# Patient Record
Sex: MALE | Race: WHITE | Employment: OTHER | URBAN - METROPOLITAN AREA
[De-identification: names, ages, dates, MRNs, and addresses within clinical notes are randomized per-mention and may not be internally consistent; named-entity substitution may affect disease eponyms.]

---

## 2022-07-07 ENCOUNTER — OFFICE VISIT (OUTPATIENT)
Dept: FAMILY MEDICINE CLINIC | Age: 72
End: 2022-07-07

## 2022-07-07 VITALS
RESPIRATION RATE: 16 BRPM | HEART RATE: 67 BPM | WEIGHT: 170 LBS | TEMPERATURE: 97.3 F | OXYGEN SATURATION: 98 % | HEIGHT: 67 IN | DIASTOLIC BLOOD PRESSURE: 109 MMHG | BODY MASS INDEX: 26.68 KG/M2 | SYSTOLIC BLOOD PRESSURE: 150 MMHG

## 2022-07-07 DIAGNOSIS — M79.10 PAIN IN THE MUSCLES: Primary | ICD-10-CM

## 2022-07-07 DIAGNOSIS — I10 ESSENTIAL HYPERTENSION: ICD-10-CM

## 2022-07-07 DIAGNOSIS — M17.10 ARTHRITIS OF KNEE: ICD-10-CM

## 2022-07-07 PROBLEM — G62.9 NEUROPATHY: Status: ACTIVE | Noted: 2021-06-14

## 2022-07-07 PROBLEM — R07.9 CHEST PAIN, RULE OUT ACUTE MYOCARDIAL INFARCTION: Status: ACTIVE | Noted: 2018-08-31

## 2022-07-07 PROBLEM — E66.3 OVERWEIGHT WITH BODY MASS INDEX (BMI) 25.0-29.9: Status: ACTIVE | Noted: 2021-06-14

## 2022-07-07 PROBLEM — R91.1 PULMONARY NODULE: Status: ACTIVE | Noted: 2018-08-31

## 2022-07-07 PROBLEM — T84.84XA PAINFUL ORTHOPAEDIC HARDWARE (HCC): Status: ACTIVE | Noted: 2018-03-27

## 2022-07-07 PROCEDURE — 1123F ACP DISCUSS/DSCN MKR DOCD: CPT | Performed by: NURSE PRACTITIONER

## 2022-07-07 PROCEDURE — 99203 OFFICE O/P NEW LOW 30 MIN: CPT | Performed by: NURSE PRACTITIONER

## 2022-07-07 RX ORDER — ACETAMINOPHEN 500 MG
500 TABLET ORAL EVERY 6 HOURS PRN
Qty: 20 TABLET | Refills: 0 | Status: SHIPPED | OUTPATIENT
Start: 2022-07-07

## 2022-07-07 RX ORDER — DIAPER,BRIEF,INFANT-TODD,DISP
EACH MISCELLANEOUS
COMMUNITY
Start: 2022-06-23

## 2022-07-07 RX ORDER — ATORVASTATIN CALCIUM 10 MG/1
TABLET, FILM COATED ORAL
COMMUNITY
Start: 2022-06-20 | End: 2022-08-11 | Stop reason: SDUPTHER

## 2022-07-07 RX ORDER — LISINOPRIL 5 MG/1
5 TABLET ORAL DAILY
Qty: 30 TABLET | Refills: 0 | Status: SHIPPED | OUTPATIENT
Start: 2022-07-07 | End: 2022-07-21 | Stop reason: SDUPTHER

## 2022-07-07 RX ORDER — TERAZOSIN 5 MG/1
CAPSULE ORAL
COMMUNITY
Start: 2022-06-23 | End: 2022-08-11 | Stop reason: SDUPTHER

## 2022-07-07 RX ORDER — TERAZOSIN 10 MG/1
CAPSULE ORAL
COMMUNITY
Start: 2022-05-27 | End: 2022-07-21 | Stop reason: DRUGHIGH

## 2022-07-07 ASSESSMENT — PATIENT HEALTH QUESTIONNAIRE - PHQ9
SUM OF ALL RESPONSES TO PHQ QUESTIONS 1-9: 0
1. LITTLE INTEREST OR PLEASURE IN DOING THINGS: 0
SUM OF ALL RESPONSES TO PHQ9 QUESTIONS 1 & 2: 0
SUM OF ALL RESPONSES TO PHQ QUESTIONS 1-9: 0
SUM OF ALL RESPONSES TO PHQ QUESTIONS 1-9: 0
2. FEELING DOWN, DEPRESSED OR HOPELESS: 0
SUM OF ALL RESPONSES TO PHQ QUESTIONS 1-9: 0

## 2022-07-07 ASSESSMENT — ENCOUNTER SYMPTOMS
CHEST TIGHTNESS: 0
SHORTNESS OF BREATH: 0

## 2022-07-07 ASSESSMENT — LIFESTYLE VARIABLES
HOW MANY STANDARD DRINKS CONTAINING ALCOHOL DO YOU HAVE ON A TYPICAL DAY: 1 OR 2
HOW OFTEN DO YOU HAVE A DRINK CONTAINING ALCOHOL: 2-3 TIMES A WEEK
HOW MANY STANDARD DRINKS CONTAINING ALCOHOL DO YOU HAVE ON A TYPICAL DAY: 1 OR 2

## 2022-07-07 NOTE — PATIENT INSTRUCTIONS
Patient Education        Arthritis: Care Instructions  Overview     Arthritis, also called osteoarthritis, is a breakdown of the cartilage that cushions your joints. When the cartilage wears down, your bones rub against each other. This causes pain and stiffness. Many people have some arthritis as they age. Arthritis most often affects the joints of the spine, hands, hips,knees, or feet. Arthritis never goes away completely. But medicine and home treatment can helpreduce pain and help you stay active. Follow-up care is a key part of your treatment and safety. Be sure to make and go to all appointments, and call your doctor if you are having problems. It's also a good idea to know your test results and keep alist of the medicines you take. How can you care for yourself at home?  Stay at a healthy weight. Being overweight puts extra strain on your joints.  Talk to your doctor or physical therapist about exercises that will help ease joint pain. ? Stretch. You may enjoy gentle forms of yoga to help keep your joints and muscles flexible. ? Walk instead of jog. Other types of exercise that are less stressful on the joints include riding a bike, swimming, sheri chi, or water exercise. ? Lift weights. Strong muscles help reduce stress on your joints. Stronger thigh muscles, for example, take some of the stress off of the knees and hips. Learn the right way to lift weights so you don't make joint pain worse.  Take your medicines exactly as prescribed. Call your doctor if you think you are having a problem with your medicine.  Take pain medicines exactly as directed. ? If the doctor gave you a prescription medicine for pain, take it as prescribed. ? If you are not taking a prescription pain medicine, ask your doctor if you can take an over-the-counter medicine.  Use a cane, crutch, walker, or another device if you need help to get around. These can help rest your joints.  You also can use other things to make life easier, such as a higher toilet seat and padded handles on kitchen utensils.  Do not sit in low chairs. They can make it hard to get up.  Put heat or cold on your sore joints as needed. Use whichever helps you most. You can also switch between hot and cold packs. ? Apply heat 2 or 3 times a day for 20 to 30 minutes--using a heating pad, hot shower, or hot pack--to relieve pain and stiffness. But don't use heat on a swollen joint. ? Put ice or a cold pack on your sore joint for 10 to 20 minutes at a time. Put a thin cloth between the ice and your skin. When should you call for help? Call your doctor now or seek immediate medical care if:     You have sudden swelling, warmth, or pain in any joint.      You have joint pain and a fever or rash.      You have such bad pain that you cannot use a joint. Watch closely for changes in your health, and be sure to contact your doctor if:     You have mild joint symptoms that continue even with more than 6 weeks of care at home.      You have stomach pain or other problems with your medicine. Where can you learn more? Go to https://Reset Therapeutics.Conceptua Math. org and sign in to your MATIvision account. Enter Y127 in the [facility] box to learn more about \"Arthritis: Care Instructions. \"     If you do not have an account, please click on the \"Sign Up Now\" link. Current as of: December 20, 2021               Content Version: 13.3  © 2006-2022 Vingle. Care instructions adapted under license by [facility] ([facility]). If you have questions about a medical condition or this instruction, always ask your healthcare professional. [place] [address] any warranty or liability for your use of this information. Patient Education        DASH Diet: Care Instructions  Your Care Instructions     The DASH diet is an eating plan that can help lower your blood pressure.  DASH stands for Dietary Approaches to Stop Hypertension. Hypertension is high bloodpressure. The DASH diet focuses on eating foods that are high in calcium, potassium, and magnesium. These nutrients can lower blood pressure. The foods that are highest in these nutrients are fruits, vegetables, low-fat dairy products, nuts, seeds, and legumes. But taking calcium, potassium, and magnesium supplements instead of eating foods that are high in those nutrients does not have the same effect. The DASH diet also includes whole grains, fish, and poultry. The DASH diet is one of several lifestyle changes your doctor may recommend to lower your high blood pressure. Your doctor may also want you to decrease the amount of sodium in your diet. Lowering sodium while following the DASH dietcan lower blood pressure even further than just the DASH diet alone. Follow-up care is a key part of your treatment and safety. Be sure to make and go to all appointments, and call your doctor if you are having problems. It's also a good idea to know your test results and keep alist of the medicines you take. How can you care for yourself at home? Following the DASH diet   Eat 4 to 5 servings of fruit each day. A serving is 1 medium-sized piece of fruit, ½ cup chopped or canned fruit, 1/4 cup dried fruit, or 4 ounces (½ cup) of fruit juice. Choose fruit more often than fruit juice.  Eat 4 to 5 servings of vegetables each day. A serving is 1 cup of lettuce or raw leafy vegetables, ½ cup of chopped or cooked vegetables, or 4 ounces (½ cup) of vegetable juice. Choose vegetables more often than vegetable juice.  Get 2 to 3 servings of low-fat and fat-free dairy each day. A serving is 8 ounces of milk, 1 cup of yogurt, or 1 ½ ounces of cheese.  Eat 6 to 8 servings of grains each day. A serving is 1 slice of bread, 1 ounce of dry cereal, or ½ cup of cooked rice, pasta, or cooked cereal. Try to choose whole-grain products as much as possible.    Limit lean meat, poultry, and fish to 2 servings each day. A serving is 3 ounces, about the size of a deck of cards.  Eat 4 to 5 servings of nuts, seeds, and legumes (cooked dried beans, lentils, and split peas) each week. A serving is 1/3 cup of nuts, 2 tablespoons of seeds, or ½ cup of cooked beans or peas.  Limit fats and oils to 2 to 3 servings each day. A serving is 1 teaspoon of vegetable oil or 2 tablespoons of salad dressing.  Limit sweets and added sugars to 5 servings or less a week. A serving is 1 tablespoon jelly or jam, ½ cup sorbet, or 1 cup of lemonade.  Eat less than 2,300 milligrams (mg) of sodium a day. If you limit your sodium to 1,500 mg a day, you can lower your blood pressure even more.  Be aware that all of these are the suggested number of servings for people who eat 1,800 to 2,000 calories a day. Your recommended number of servings may be different if you need more or fewer calories. Tips for success   Start small. Do not try to make dramatic changes to your diet all at once. You might feel that you are missing out on your favorite foods and then be more likely to not follow the plan. Make small changes, and stick with them. Once those changes become habit, add a few more changes.  Try some of the following:  ? Make it a goal to eat a fruit or vegetable at every meal and at snacks. This will make it easy to get the recommended amount of fruits and vegetables each day. ? Try yogurt topped with fruit and nuts for a snack or healthy dessert. ? Add lettuce, tomato, cucumber, and onion to sandwiches. ? Combine a ready-made pizza crust with low-fat mozzarella cheese and lots of vegetable toppings. Try using tomatoes, squash, spinach, broccoli, carrots, cauliflower, and onions. ? Have a variety of cut-up vegetables with a low-fat dip as an appetizer instead of chips and dip. ? Sprinkle sunflower seeds or chopped almonds over salads. Or try adding chopped walnuts or almonds to cooked vegetables.   ? Try some vegetarian meals using beans and peas. Add garbanzo or kidney beans to salads. Make burritos and tacos with mashed chang beans or black beans. Where can you learn more? Go to https://scotty.Catawiki. org and sign in to your IRL Gaming account. Enter I940 in the [facility] box to learn more about \"DASH Diet: Care Instructions. \"     If you do not have an account, please click on the \"Sign Up Now\" link. Current as of: January 10, 2022               Content Version: 13.3  © 3367-1413 Apps4All. Care instructions adapted under license by [facility] ([facility]). If you have questions about a medical condition or this instruction, always ask your healthcare professional. [address] any warranty or liability for your use of this information. Patient Education        Learning About High Blood Pressure  What is high blood pressure? Blood pressure is a measure of how hard the blood pushes against the walls of your arteries. It's normal for blood pressure to go up and down throughout the day. But if it stays up, you have high blood pressure. Another name for highblood pressure is hypertension. Two numbers tell you your blood pressure. The first number is the systolic pressure (top number). It shows how hard the blood pushes when your heart is pumping. The second number is the diastolic pressure (bottom number). It shows how hard the blood pushes between heartbeats, when your heart is relaxed andfilling with blood. Your doctor will give you a goal for your blood pressure based on your health and your age. High blood pressure (hypertension) means that the top numberstays high, or the bottom number stays high, or both. High blood pressure increases the risk of stroke, heart attack, and otherproblems. What happens when you have high blood pressure?  Blood flows through your arteries with too much force.  Over time, this can damage the heart and the walls of your arteries. But you can't feel it. High blood pressure usually doesn't cause symptoms.  High blood pressure makes your heart work harder. And that can lead to heart failure, which means your heart doesn't pump as much blood as your body needs.  Fat and calcium start to build up in your arteries. This buildup is called hardening of the arteries. It can cause many problems including a heart attack and stroke.  Arteries also carry blood and oxygen to organs like your eyes, kidneys, and brain. If high blood pressure damages those arteries, it can lead to vision loss, kidney disease, stroke, and a higher risk of dementia. How can you prevent high blood pressure? Here are some things you can do to help prevent high blood pressure.  Stay at a healthy weight.  Try to limit how much sodium you eat to less than 2,300 milligrams (mg) a day. If you limit your sodium to 1,500 mg a day, you can lower your blood pressure even more. ? Buy foods that are labeled \"unsalted,\" \"sodium-free,\" or \"low-sodium. \" Foods labeled \"reduced-sodium\" and \"light sodium\" may still have too much sodium. ? Flavor your food with garlic, lemon juice, onion, vinegar, herbs, and spices instead of salt. Do not use soy sauce, steak sauce, onion salt, garlic salt, mustard, or ketchup on your food. ? Use less salt (or none) when recipes call for it. You can often use half the salt a recipe calls for without losing flavor.  Be physically active. Get at least 30 minutes of exercise on most days of the week. Walking is a good choice. You also may want to do other activities, such as running, swimming, cycling, or playing tennis or team sports.  Limit alcohol to 2 drinks a day for men and 1 drink a day for women.  Eat plenty of fruits, vegetables, and low-fat dairy products. Eat less saturated and total fats. How is high blood pressure treated?  Your doctor will suggest making lifestyle changes to help your heart.  For example, your doctor may ask you to eat healthy foods, quit smoking, lose extra weight, and be more active.  If lifestyle changes don't help enough, your doctor may recommend that you take medicine.  When blood pressure is very high, medicines are needed to lower it. Follow-up care is a key part of your treatment and safety. Be sure to make and go to all appointments, and call your doctor if you are having problems. It's also a good idea to know your test results and keep alist of the medicines you take. Where can you learn more? Go to https://Casual CollectivepePaperfold.Opexa Therapeutics. org and sign in to your Alereon account. Enter P501 in the [employer] box to learn more about \"Learning About High Blood Pressure. \"     If you do not have an account, please click on the \"Sign Up Now\" link. Current as of: January 10, 2022               Content Version: 13.3  © 8404-2514 Healthwise, Incorporated. Care instructions adapted under license by [facility] ([facility]). If you have questions about a medical condition or this instruction, always ask your healthcare professional. [address] any warranty or liability for your use of this information.

## 2022-07-07 NOTE — PROGRESS NOTES
Digna Schwartz (:  1950) is a 81 y.o. male,New patient, here for evaluation of the following chief complaint(s):  Muscle Pain        SUBJECTIVE/OBJECTIVE:    HPI:  The patient is a 81 y.o. female  who presents [facility] for muscle pain. Beside patient has other multiple chronic medical conditions. He uses cane for support the balance. Good compliance with medications. Patient new concerns muscle ache. He takes Ibuprofen. Ibuprofen not good with blood pressure. Patient is taking Terazosin at night for blood pressure. I recommend Lisinopril 5 mg in the morning with breakfast for better control his blood pressure. For the pain will order tylenol 500 mg to take with food PRN. BP (!) 150/109   Pulse 67   Temp 97.3 °F (36.3 °C) (Temporal)   Resp 16   Ht 5' 7\" (1.702 m)   Wt 170 lb (77.1 kg)   SpO2 98%   BMI 26.63 kg/m²     No results found for: CBC, CMP, LIPIDPAN, TSH, HBA1C     Allergies   Allergen Reactions    Penicillins Itching       Current Outpatient Medications   Medication Sig Dispense Refill    acetaminophen (TYLENOL) 500 MG tablet Take 1 tablet by mouth every 6 hours as needed for Pain 20 tablet 0    lisinopril (PRINIVIL;ZESTRIL) 5 MG tablet Take 1 tablet by mouth daily 30 tablet 0    atorvastatin (LIPITOR) 10 MG tablet TAKE 1 TABLET BY MOUTH EVERY DAY      hydrocortisone 1 % ointment APPLY SPARINGLY TO AFFECTED AREA TWICE A DAY      terazosin (HYTRIN) 10 MG capsule TAKE 1 CAPSULE BY MOUTH AT BEDTIME      terazosin (HYTRIN) 5 MG capsule TAKE 1 CAPSULE (5 MG) BY ORAL ROUTE ONCE DAILY AT BEDTIME       No current facility-administered medications for this visit. History reviewed. No pertinent past medical history. History reviewed. No pertinent surgical history. Review of Systems   Constitutional: Negative for fatigue. HENT: Negative for congestion. Respiratory: Negative for chest tightness and shortness of breath.     Cardiovascular: Negative for chest pain.   Musculoskeletal: Positive for arthralgias and myalgias. Physical Exam  Vitals reviewed. HENT:      Head: Normocephalic. Nose: Nose normal.   Cardiovascular:      Heart sounds: Normal heart sounds. Pulmonary:      Effort: Pulmonary effort is normal.      Breath sounds: Normal breath sounds. Musculoskeletal:         General: Tenderness present. Cervical back: Normal range of motion. Neurological:      Mental Status: He is alert and oriented to person, place, and time. Psychiatric:         Mood and Affect: Mood normal.         Behavior: Behavior normal.         Thought Content: Thought content normal.         Judgment: Judgment normal.           ASSESSMENT/PLAN:  1. Pain in the muscles  -     acetaminophen (TYLENOL) 500 MG tablet; Take 1 tablet by mouth every 6 hours as needed for Pain, Disp-20 tablet, R-0Print  2. Arthritis of knee  -     acetaminophen (TYLENOL) 500 MG tablet; Take 1 tablet by mouth every 6 hours as needed for Pain, Disp-20 tablet, R-0Print  3. Essential hypertension  -     lisinopril (PRINIVIL;ZESTRIL) 5 MG tablet; Take 1 tablet by mouth daily, Disp-30 tablet, R-0Print      Return if symptoms worsen or fail to improve, for F/U mucsle pain. On this date 7/7/2022 I have spent 30 minutes reviewing previous notes, test results and face to face with the patient discussing the diagnosis and importance of compliance with the treatment plan as well as documenting on the day of the visit. An electronic signature was used to authenticate this note.     --MIRACLE Martinez - CNP

## 2022-07-21 ENCOUNTER — OFFICE VISIT (OUTPATIENT)
Dept: FAMILY MEDICINE CLINIC | Age: 72
End: 2022-07-21

## 2022-07-21 VITALS
HEART RATE: 72 BPM | RESPIRATION RATE: 15 BRPM | HEIGHT: 67 IN | WEIGHT: 171 LBS | DIASTOLIC BLOOD PRESSURE: 76 MMHG | BODY MASS INDEX: 26.84 KG/M2 | OXYGEN SATURATION: 97 % | TEMPERATURE: 97 F | SYSTOLIC BLOOD PRESSURE: 134 MMHG

## 2022-07-21 DIAGNOSIS — I10 ESSENTIAL HYPERTENSION: Primary | ICD-10-CM

## 2022-07-21 DIAGNOSIS — Z59.82 LACK OF ACCESS TO TRANSPORTATION: ICD-10-CM

## 2022-07-21 DIAGNOSIS — Z59.9 FINANCIAL DIFFICULTIES: ICD-10-CM

## 2022-07-21 PROCEDURE — 1123F ACP DISCUSS/DSCN MKR DOCD: CPT | Performed by: NURSE PRACTITIONER

## 2022-07-21 PROCEDURE — 99214 OFFICE O/P EST MOD 30 MIN: CPT | Performed by: NURSE PRACTITIONER

## 2022-07-21 RX ORDER — LISINOPRIL 5 MG/1
5 TABLET ORAL DAILY
Qty: 30 TABLET | Refills: 0 | Status: SHIPPED | OUTPATIENT
Start: 2022-07-21

## 2022-07-21 RX ORDER — LISINOPRIL 5 MG/1
5 TABLET ORAL DAILY
Qty: 30 TABLET | Refills: 0 | Status: SHIPPED | OUTPATIENT
Start: 2022-07-21 | End: 2022-07-21 | Stop reason: SDUPTHER

## 2022-07-21 SDOH — ECONOMIC STABILITY - TRANSPORTATION SECURITY: TRANSPORTATION INSECURITY: Z59.82

## 2022-07-21 SDOH — ECONOMIC STABILITY - INCOME SECURITY: PROBLEM RELATED TO HOUSING AND ECONOMIC CIRCUMSTANCES, UNSPECIFIED: Z59.9

## 2022-07-21 ASSESSMENT — LIFESTYLE VARIABLES
HOW OFTEN DO YOU HAVE A DRINK CONTAINING ALCOHOL: MONTHLY OR LESS
HOW MANY STANDARD DRINKS CONTAINING ALCOHOL DO YOU HAVE ON A TYPICAL DAY: 1 OR 2

## 2022-07-21 ASSESSMENT — ENCOUNTER SYMPTOMS
SHORTNESS OF BREATH: 0
WHEEZING: 0
CHEST TIGHTNESS: 0

## 2022-07-21 NOTE — PROGRESS NOTES
eRbekah Martinez (:  1950) is a 81 y.o. male,Established patient, here for evaluation of the following chief complaint(s):  Hypertension (Checking BP)        SUBJECTIVE/OBJECTIVE:    HPI:  [facility] 81 [facility] in [address] [place] [address] to check blood pressure and medication refill. Patient blood pressure is controlled on his current medication and he requests refill today. Patient reports taking medication as prescribed. Patient denies medication side effects. Patient denies headaches, chest pains, vision changes and swelling in his lower extremities. /76 (Site: Right Upper Arm, Position: Sitting, Cuff Size: Medium Adult)   Pulse 72   Temp 97 °F (36.1 °C) (Temporal)   Resp 15   Ht 5' 7\" (1.702 m)   Wt 171 lb (77.6 kg)   SpO2 97%   BMI 26.78 kg/m²     No results found for: CBC, CMP, LIPIDPAN, TSH, HBA1C     Allergies   Allergen Reactions    Penicillins Itching       Current Outpatient Medications   Medication Sig Dispense Refill    lisinopril (PRINIVIL;ZESTRIL) 5 MG tablet Take 1 tablet by mouth in the morning. 30 tablet 0    atorvastatin (LIPITOR) 10 MG tablet TAKE 1 TABLET BY MOUTH EVERY DAY      hydrocortisone 1 % ointment APPLY SPARINGLY TO AFFECTED AREA TWICE A DAY      terazosin (HYTRIN) 5 MG capsule TAKE 1 CAPSULE (5 MG) BY ORAL ROUTE ONCE DAILY AT BEDTIME      acetaminophen (TYLENOL) 500 MG tablet Take 1 tablet by mouth every 6 hours as needed for Pain 20 tablet 0     No current facility-administered medications for this visit. History reviewed. No pertinent past medical history. History reviewed. No pertinent surgical history. Review of Systems   Constitutional:  Negative for fatigue. HENT:  Negative for congestion. Respiratory:  Negative for chest tightness, shortness of breath and wheezing. Cardiovascular:  Negative for chest pain. Neurological:  Negative for dizziness and headaches. Physical Exam  Vitals reviewed.    HENT:      Head: Normocephalic. Nose: Nose normal.   Cardiovascular:      Heart sounds: Normal heart sounds. Pulmonary:      Effort: Pulmonary effort is normal.      Breath sounds: Normal breath sounds. Musculoskeletal:         General: Normal range of motion. Cervical back: Normal range of motion. Neurological:      Mental Status: He is alert and oriented to person, place, and time. Psychiatric:         Mood and Affect: Mood normal.         Behavior: Behavior normal.         Thought Content: Thought content normal.         Judgment: Judgment normal.         ASSESSMENT/PLAN:  1. Essential hypertension  -     lisinopril (PRINIVIL;ZESTRIL) 5 MG tablet; Take 1 tablet by mouth in the morning., Disp-30 tablet, R-0Normal      Return in about 4 weeks (around 8/18/2022) for F/U HTN. On this date 7/21/2022 I have spent 30 minutes reviewing previous notes, test results and face to face with the patient discussing the diagnosis and importance of compliance with the treatment plan as well as documenting on the day of the visit. An electronic signature was used to authenticate this note.     --MIRACLE Samayoa - CNP

## 2022-08-11 ENCOUNTER — OFFICE VISIT (OUTPATIENT)
Dept: FAMILY MEDICINE CLINIC | Age: 72
End: 2022-08-11

## 2022-08-11 VITALS
HEART RATE: 70 BPM | TEMPERATURE: 97.3 F | SYSTOLIC BLOOD PRESSURE: 142 MMHG | BODY MASS INDEX: 26.06 KG/M2 | OXYGEN SATURATION: 97 % | HEIGHT: 67 IN | WEIGHT: 166 LBS | RESPIRATION RATE: 15 BRPM | DIASTOLIC BLOOD PRESSURE: 87 MMHG

## 2022-08-11 DIAGNOSIS — G25.81 RESTLESS LEG SYNDROME: ICD-10-CM

## 2022-08-11 DIAGNOSIS — E78.2 MIXED HYPERLIPIDEMIA: ICD-10-CM

## 2022-08-11 DIAGNOSIS — Z59.9 FINANCIAL DIFFICULTIES: ICD-10-CM

## 2022-08-11 DIAGNOSIS — I10 ESSENTIAL HYPERTENSION: Primary | ICD-10-CM

## 2022-08-11 PROCEDURE — 99214 OFFICE O/P EST MOD 30 MIN: CPT | Performed by: NURSE PRACTITIONER

## 2022-08-11 PROCEDURE — 1123F ACP DISCUSS/DSCN MKR DOCD: CPT | Performed by: NURSE PRACTITIONER

## 2022-08-11 RX ORDER — ATORVASTATIN CALCIUM 10 MG/1
10 TABLET, FILM COATED ORAL NIGHTLY
Qty: 30 TABLET | Refills: 0 | Status: SHIPPED | OUTPATIENT
Start: 2022-08-11

## 2022-08-11 RX ORDER — TERAZOSIN 5 MG/1
5 CAPSULE ORAL NIGHTLY
Qty: 30 CAPSULE | Refills: 0 | Status: SHIPPED | OUTPATIENT
Start: 2022-08-11

## 2022-08-11 SDOH — ECONOMIC STABILITY - INCOME SECURITY: PROBLEM RELATED TO HOUSING AND ECONOMIC CIRCUMSTANCES, UNSPECIFIED: Z59.9

## 2022-08-11 ASSESSMENT — ENCOUNTER SYMPTOMS
SHORTNESS OF BREATH: 0
CHEST TIGHTNESS: 0

## 2022-08-11 NOTE — PROGRESS NOTES
Kit Coffman (:  1950) is a 81 y.o. male,Established patient, here for evaluation of the following chief complaint(s):  Hypertension, Medication Refill, and Hyperlipidemia        SUBJECTIVE/OBJECTIVE:    HPI:  Mr. Margaux Nelson is a 81year-old gentleman with multiple medical problems including restless leg syndrome,  hypertension, hyperlipidemia, BPH who presents in [address] [place] [address] to check his blood pressure. Patient reports take BP medication at night. Patient's blood pressure is mild elevated. Patient said as result not taking his medication few days ago. Patient denies symptoms at this visit, said only to check his blood pressure. BP (!) 142/87 (Site: Right Upper Arm, Position: Sitting, Cuff Size: Medium Adult)   Pulse 70   Temp 97.3 °F (36.3 °C) (Temporal)   Resp 15   Ht 5' 7\" (1.702 m)   Wt 166 lb (75.3 kg)   SpO2 97%   BMI 26.00 kg/m²     No results found for: CBC, CMP, LIPIDPAN, TSH, HBA1C     Allergies   Allergen Reactions    Penicillins Itching       Current Outpatient Medications   Medication Sig Dispense Refill    atorvastatin (LIPITOR) 10 MG tablet Take 1 tablet by mouth at bedtime 30 tablet 0    terazosin (HYTRIN) 5 MG capsule Take 1 capsule by mouth nightly 30 capsule 0    lisinopril (PRINIVIL;ZESTRIL) 5 MG tablet Take 1 tablet by mouth in the morning. 30 tablet 0    hydrocortisone 1 % ointment APPLY SPARINGLY TO AFFECTED AREA TWICE A DAY      acetaminophen (TYLENOL) 500 MG tablet Take 1 tablet by mouth every 6 hours as needed for Pain 20 tablet 0     No current facility-administered medications for this visit. History reviewed. No pertinent past medical history. History reviewed. No pertinent surgical history. Review of Systems   Constitutional:  Negative for fatigue. HENT:  Negative for congestion. Respiratory:  Negative for chest tightness and shortness of breath. Cardiovascular:  Negative for chest pain. Physical Exam  Vitals reviewed.    HENT: Head: Normocephalic. Nose: Nose normal.   Cardiovascular:      Heart sounds: Normal heart sounds. Pulmonary:      Effort: Pulmonary effort is normal.      Breath sounds: Normal breath sounds. Musculoskeletal:         General: Normal range of motion. Cervical back: Normal range of motion. Neurological:      Mental Status: He is alert and oriented to person, place, and time. Psychiatric:         Mood and Affect: Mood normal.         Behavior: Behavior normal.         Thought Content: Thought content normal.         Judgment: Judgment normal.         ASSESSMENT/PLAN:  I had instructed patient to take medication as indicated directed, to control the blood pressure. Patient request his medication be sent to [facility] pharmacy. Patient needs blood work on the next visit     1. Essential hypertension  -     terazosin (HYTRIN) 5 MG capsule; Take 1 capsule by mouth nightly, Disp-30 capsule, R-0Normal  2. Restless leg syndrome  3. Mixed hyperlipidemia  -     atorvastatin (LIPITOR) 10 MG tablet; Take 1 tablet by mouth at bedtime, Disp-30 tablet, R-0Normal  4. Financial difficulties      Return if symptoms worsen or fail to improve, for F/U Blood pressure check. On this date 8/11/2022 I have spent 30 minutes reviewing previous notes, test results and face to face with the patient discussing the diagnosis and importance of compliance with the treatment plan as well as documenting on the day of the visit. An electronic signature was used to authenticate this note.     --MIRACLE Miguel - CNP

## 2022-11-26 ENCOUNTER — HOSPITAL ENCOUNTER (EMERGENCY)
Age: 72
Discharge: HOME | End: 2022-11-26
Attending: EMERGENCY MEDICINE
Payer: MEDICARE

## 2022-11-26 VITALS
HEART RATE: 90 BPM | RESPIRATION RATE: 20 BRPM | WEIGHT: 170 LBS | DIASTOLIC BLOOD PRESSURE: 90 MMHG | BODY MASS INDEX: 26.68 KG/M2 | OXYGEN SATURATION: 97 % | SYSTOLIC BLOOD PRESSURE: 151 MMHG | TEMPERATURE: 98.4 F | HEIGHT: 67 IN

## 2022-11-26 DIAGNOSIS — R31.0 GROSS HEMATURIA: Primary | ICD-10-CM

## 2022-11-26 DIAGNOSIS — C61 PROSTATE CANCER (HCC): ICD-10-CM

## 2022-11-26 LAB
APPEARANCE UR: CLEAR
BACTERIA URNS QL MICRO: NEGATIVE /HPF
BILIRUB UR QL: NEGATIVE
CASTS URNS QL MICRO: ABNORMAL /LPF
COLOR UR: ABNORMAL
EPI CELLS #/AREA URNS HPF: ABNORMAL /HPF
GLUCOSE UR STRIP.AUTO-MCNC: NEGATIVE MG/DL
HGB UR QL STRIP: ABNORMAL
KETONES UR QL STRIP.AUTO: ABNORMAL MG/DL
LEUKOCYTE ESTERASE UR QL STRIP.AUTO: ABNORMAL
NITRITE UR QL STRIP.AUTO: NEGATIVE
PH UR STRIP: 6 [PH] (ref 5–9)
PROT UR STRIP-MCNC: NEGATIVE MG/DL
RBC #/AREA URNS HPF: >100 /HPF
SP GR UR REFRACTOMETRY: 1.03 (ref 1–1.02)
UROBILINOGEN UR QL STRIP.AUTO: 1 EU/DL (ref 0.2–1)
WBC URNS QL MICRO: ABNORMAL /HPF

## 2022-11-26 PROCEDURE — 81001 URINALYSIS AUTO W/SCOPE: CPT

## 2022-11-26 PROCEDURE — 99283 EMERGENCY DEPT VISIT LOW MDM: CPT

## 2022-11-26 ASSESSMENT — PAIN - FUNCTIONAL ASSESSMENT: PAIN_FUNCTIONAL_ASSESSMENT: NONE - DENIES PAIN

## 2022-11-26 ASSESSMENT — ENCOUNTER SYMPTOMS
DIARRHEA: 0
VOMITING: 0

## 2022-11-26 NOTE — DISCHARGE INSTRUCTIONS
Follow-up with [facility] urology, call the office to make an appointment. Return to the emergency department if your symptoms worsen.

## 2022-11-26 NOTE — ED TRIAGE NOTES
Pt presents from EMS with hematuria that began today, pain with urination, EMS vitals; 140/86, 86, 97%

## 2022-11-26 NOTE — ED NOTES
I have reviewed discharge instructions with the patient. The patient verbalized understanding. Patient left ED via Discharge Method: ambulatory to Home with self in cab provided by the hospital.    Opportunity for questions and clarification provided. Patient given 0 scripts. To continue your aftercare when you leave the hospital, you may receive an automated call from our care team to check in on how you are doing. This is a free service and part of our promise to provide the best care and service to meet your aftercare needs.  If you have questions, or wish to unsubscribe from this service please call [phone]. Thank you for Choosing our [facility] Emergency Department.           Casey Marin RN  11/26/22 [id number]

## 2022-11-26 NOTE — ED PROVIDER NOTES
[facility] Provider Note                   PCP:                Md Liz Latif               Age: 81 y.o. Sex: male       ICD-10-CM    1. Gross hematuria  R31.0       2. Prostate cancer [facility])  C61           DISPOSITION Decision To Discharge 11/26/2022 05:39:44 AM       New Prescriptions    No medications on file       Orders Placed This Encounter   Procedures    Urinalysis         Jr Tran is a 81 y.o. male who presents to the Emergency Department with chief complaint of    Chief Complaint   Patient presents with    Hematuria      Patient is a 72-year-old male with a history of prostate cancer who presents with hematuria and dysuria. He states he woke up tonight to urinate and noticed there was blood in his urine. There were no clots. He also had dysuria. When he urinated prior to going to bed, he had none of the symptoms. He states he has had similar symptoms in the past.  He denies abdominal pain or fever, has had no nausea or vomiting. Review of Systems   Constitutional:  Negative for chills and fever. Gastrointestinal:  Negative for diarrhea and vomiting. Genitourinary:  Positive for dysuria and hematuria. All other systems reviewed and are negative. All other systems reviewed and are negative. Past Medical History:   Diagnosis Date    Prostate cancer ([facility] [address]         No past surgical history on file. No family history on file. Social Connections: Not on file        Allergies   Allergen Reactions    Penicillins Itching        Vitals signs and nursing note reviewed. Patient Vitals for the past 4 hrs:   Temp Pulse Resp BP SpO2   11/26/22 0307 98.4 °F (36.9 °C) 90 20 (!) 151/90 97 %          Physical Exam  Vitals and nursing note reviewed. Constitutional:       General: He is not in acute distress. Appearance: Normal appearance. HENT:      Head: Normocephalic and atraumatic. Eyes:      General:         Right eye: No discharge.          Left eye: No discharge. Conjunctiva/sclera: Conjunctivae normal.   Cardiovascular:      Rate and Rhythm: Normal rate and regular rhythm. Pulmonary:      Effort: Pulmonary effort is normal. No respiratory distress. Abdominal:      General: There is no distension. Palpations: Abdomen is soft. Tenderness: There is no abdominal tenderness. Musculoskeletal:      Right lower leg: No edema. Left lower leg: No edema. Skin:     General: Skin is warm. Neurological:      Mental Status: He is alert. Psychiatric:         Mood and Affect: Mood normal.         Behavior: Behavior normal.        MDM  Number of Diagnoses or Management Options  Gross hematuria: new, needed workup  Prostate cancer [facility]): new, needed workup  Diagnosis management comments: 5:40 AM discussed results with patient, no infection seen thus no antibiotics. He [facility] to the [facility], wants to see someone at [facility]] urology. Will provide him with follow-up information. Amount and/or Complexity of Data Reviewed  Clinical lab tests: ordered and reviewed    Risk of Complications, Morbidity, and/or Mortality  Presenting problems: moderate  Diagnostic procedures: moderate  Management options: moderate    Patient Progress  Patient progress: stable      Procedures    Labs Reviewed   URINALYSIS - Abnormal; Notable for the following components:       Result Value    Specific Gravity, UA 1.029 (*)     Ketones, Urine TRACE (*)     Blood, Urine LARGE (*)     Leukocyte Esterase, Urine TRACE (*)     WBC, UA 5-10 (*)     RBC, UA >100 (*)     All other components within normal limits        No orders to display            Haiku Coma Scale  Eye Opening: Spontaneous  Best Verbal Response: Oriented  Best Motor Response: Obeys commands  Haiku Coma Scale Score: 15                     Voice dictation software was used during the making of this note. This software is not perfect and grammatical and other typographical errors may be present. This note has not been completely proofread for errors.        Franko Cobos MD  11/26/22 3164

## 2023-03-04 ENCOUNTER — HOSPITAL ENCOUNTER (EMERGENCY)
Age: 73
Discharge: HOME | End: 2023-03-04
Attending: EMERGENCY MEDICINE
Payer: MEDICARE

## 2023-03-04 ENCOUNTER — APPOINTMENT (OUTPATIENT)
Dept: CT IMAGING | Age: 73
End: 2023-03-04
Payer: MEDICARE

## 2023-03-04 VITALS
HEART RATE: 82 BPM | SYSTOLIC BLOOD PRESSURE: 133 MMHG | TEMPERATURE: 97.7 F | BODY MASS INDEX: 26.63 KG/M2 | OXYGEN SATURATION: 96 % | DIASTOLIC BLOOD PRESSURE: 96 MMHG | RESPIRATION RATE: 18 BRPM | WEIGHT: 170 LBS

## 2023-03-04 DIAGNOSIS — R10.9 RIGHT FLANK PAIN: Primary | ICD-10-CM

## 2023-03-04 LAB
ALBUMIN SERPL-MCNC: 3.7 G/DL (ref 3.2–4.6)
ALBUMIN/GLOB SERPL: 1.1 (ref 0.4–1.6)
ALP SERPL-CCNC: 66 U/L (ref 50–136)
ALT SERPL-CCNC: 28 U/L (ref 12–65)
ANION GAP SERPL CALC-SCNC: 8 MMOL/L (ref 2–11)
APPEARANCE UR: CLEAR
AST SERPL-CCNC: 39 U/L (ref 15–37)
BACTERIA URNS QL MICRO: 0 /HPF
BASOPHILS # BLD: 0 K/UL (ref 0–0.2)
BASOPHILS NFR BLD: 0 % (ref 0–2)
BILIRUB SERPL-MCNC: 0.7 MG/DL (ref 0.2–1.1)
BILIRUB UR QL: NEGATIVE
BILIRUB UR QL: NEGATIVE
BUN SERPL-MCNC: 18 MG/DL (ref 8–23)
CALCIUM SERPL-MCNC: 9.4 MG/DL (ref 8.3–10.4)
CHLORIDE SERPL-SCNC: 105 MMOL/L (ref 101–110)
CO2 SERPL-SCNC: 22 MMOL/L (ref 21–32)
COLOR UR: ABNORMAL
CREAT SERPL-MCNC: 1.3 MG/DL (ref 0.8–1.5)
DIFFERENTIAL METHOD BLD: NORMAL
EOSINOPHIL # BLD: 0.1 K/UL (ref 0–0.8)
EOSINOPHIL NFR BLD: 1 % (ref 0.5–7.8)
EPI CELLS #/AREA URNS HPF: ABNORMAL /HPF
ERYTHROCYTE [DISTWIDTH] IN BLOOD BY AUTOMATED COUNT: 13.1 % (ref 11.9–14.6)
GLOBULIN SER CALC-MCNC: 3.4 G/DL (ref 2.8–4.5)
GLUCOSE SERPL-MCNC: 113 MG/DL (ref 65–100)
GLUCOSE UR QL STRIP.AUTO: NEGATIVE MG/DL
GLUCOSE UR STRIP.AUTO-MCNC: NEGATIVE MG/DL
HCT VFR BLD AUTO: 41.6 % (ref 41.1–50.3)
HGB BLD-MCNC: 14.3 G/DL (ref 13.6–17.2)
HGB UR QL STRIP: NEGATIVE
IMM GRANULOCYTES # BLD AUTO: 0.1 K/UL (ref 0–0.5)
IMM GRANULOCYTES NFR BLD AUTO: 1 % (ref 0–5)
KETONES UR QL STRIP.AUTO: ABNORMAL MG/DL
KETONES UR-MCNC: NEGATIVE MG/DL
LEUKOCYTE ESTERASE UR QL STRIP.AUTO: NEGATIVE
LEUKOCYTE ESTERASE UR QL STRIP: NEGATIVE
LIPASE SERPL-CCNC: 91 U/L (ref 73–393)
LYMPHOCYTES # BLD: 1.8 K/UL (ref 0.5–4.6)
LYMPHOCYTES NFR BLD: 18 % (ref 13–44)
MCH RBC QN AUTO: 31 PG (ref 26.1–32.9)
MCHC RBC AUTO-ENTMCNC: 34.4 G/DL (ref 31.4–35)
MCV RBC AUTO: 90 FL (ref 82–102)
MONOCYTES # BLD: 0.8 K/UL (ref 0.1–1.3)
MONOCYTES NFR BLD: 8 % (ref 4–12)
NEUTS SEG # BLD: 7.6 K/UL (ref 1.7–8.2)
NEUTS SEG NFR BLD: 72 % (ref 43–78)
NITRITE UR QL STRIP.AUTO: NEGATIVE
NITRITE UR QL: NEGATIVE
NRBC # BLD: 0 K/UL (ref 0–0.2)
OTHER OBSERVATIONS: ABNORMAL
PH UR STRIP: 5.5 (ref 5–9)
PH UR: 5.5 (ref 5–9)
PLATELET # BLD AUTO: 213 K/UL (ref 150–450)
PMV BLD AUTO: 10.8 FL (ref 9.4–12.3)
POTASSIUM SERPL-SCNC: 3.8 MMOL/L (ref 3.5–5.1)
PROT SERPL-MCNC: 7.1 G/DL (ref 6.3–8.2)
PROT UR QL: ABNORMAL MG/DL
PROT UR STRIP-MCNC: ABNORMAL MG/DL
RBC # BLD AUTO: 4.62 M/UL (ref 4.23–5.6)
RBC # UR STRIP: ABNORMAL
RBC #/AREA URNS HPF: ABNORMAL /HPF
SERVICE CMNT-IMP: ABNORMAL
SODIUM SERPL-SCNC: 135 MMOL/L (ref 133–143)
SP GR UR REFRACTOMETRY: 1.02 (ref 1–1.02)
SP GR UR: 1.02 (ref 1–1.02)
UROBILINOGEN UR QL STRIP.AUTO: 0.2 EU/DL (ref 0.2–1)
UROBILINOGEN UR QL: 0.2 EU/DL (ref 0.2–1)
WBC # BLD AUTO: 10.4 K/UL (ref 4.3–11.1)
WBC URNS QL MICRO: ABNORMAL /HPF

## 2023-03-04 PROCEDURE — 96375 TX/PRO/DX INJ NEW DRUG ADDON: CPT

## 2023-03-04 PROCEDURE — 85025 COMPLETE CBC W/AUTO DIFF WBC: CPT

## 2023-03-04 PROCEDURE — 83690 ASSAY OF LIPASE: CPT

## 2023-03-04 PROCEDURE — 80053 COMPREHEN METABOLIC PANEL: CPT

## 2023-03-04 PROCEDURE — 96374 THER/PROPH/DIAG INJ IV PUSH: CPT

## 2023-03-04 PROCEDURE — 6360000002 HC RX W HCPCS: Performed by: EMERGENCY MEDICINE

## 2023-03-04 PROCEDURE — 81003 URINALYSIS AUTO W/O SCOPE: CPT

## 2023-03-04 PROCEDURE — 74176 CT ABD & PELVIS W/O CONTRAST: CPT

## 2023-03-04 PROCEDURE — 99284 EMERGENCY DEPT VISIT MOD MDM: CPT

## 2023-03-04 PROCEDURE — 81001 URINALYSIS AUTO W/SCOPE: CPT

## 2023-03-04 RX ORDER — ONDANSETRON 2 MG/ML
4 INJECTION INTRAMUSCULAR; INTRAVENOUS
Status: COMPLETED | OUTPATIENT
Start: 2023-03-04 | End: 2023-03-04

## 2023-03-04 RX ORDER — KETOROLAC TROMETHAMINE 30 MG/ML
15 INJECTION, SOLUTION INTRAMUSCULAR; INTRAVENOUS ONCE
Status: COMPLETED | OUTPATIENT
Start: 2023-03-04 | End: 2023-03-04

## 2023-03-04 RX ADMIN — KETOROLAC TROMETHAMINE 15 MG: 30 INJECTION, SOLUTION INTRAMUSCULAR at 08:12

## 2023-03-04 RX ADMIN — ONDANSETRON 4 MG: 2 INJECTION INTRAMUSCULAR; INTRAVENOUS at 08:12

## 2023-03-04 ASSESSMENT — PAIN - FUNCTIONAL ASSESSMENT: PAIN_FUNCTIONAL_ASSESSMENT: 0-10

## 2023-03-04 ASSESSMENT — ENCOUNTER SYMPTOMS
GASTROINTESTINAL NEGATIVE: 1
RESPIRATORY NEGATIVE: 1
BACK PAIN: 0

## 2023-03-04 ASSESSMENT — PAIN DESCRIPTION - LOCATION: LOCATION: BACK

## 2023-03-04 ASSESSMENT — PAIN SCALES - GENERAL
PAINLEVEL_OUTOF10: 8
PAINLEVEL_OUTOF10: 8

## 2023-03-04 NOTE — ED TRIAGE NOTES
Pt c/o of lower back pain that is more to the right. Pain is intermittent x1 weeks. Pt has prostate cancer. Denies NVD. Hx of kidney stones. Hx of hypertension but has been noncompliant with meds x3 months.

## 2023-03-04 NOTE — ED PROVIDER NOTES
Emergency Department Provider Note                   PCP:                On File Not (Inactive)               Age: 81 y.o. Sex: male     DISPOSITION Decision To Discharge 03/04/2023 09:52:57 AM       ICD-10-CM    1. Right flank pain  R10.9 [address] [facility], [facility] DECISION MAKING  Complexity of Problems Addressed:  1 or more acute illnesses that pose a threat to life or bodily function. Data Reviewed and Analyzed:  Category 1:     I ordered each unique test.  I reviewed the results of each unique test.        Category 2:   I independently ordered and reviewed the CT Scan. CT scan without any obvious dilated aortic shadow. No obvious hydronephrosis, renal calculi noted on my evaluation. Agree with radiologist interpretation  I independently ordered and reviewed the labs, imaging    Category 3: Discussion of management or test interpretation. Right flank pain. Urine with blood without any signs of infection. Possible recently passed kidney stone. He is well-appearing at this time. I gave Toradol and Zofran with improvement in symptoms. I have a low suspicion for any acute intra-abdominal surgical pathology. In regards to his prostate cancer has been lost to follow-up I did give him new referral to the urologist.  I strongly urged that he follow-up with a urologist for further treatment of his prostate cancer. He is in agreement at this time. He is stable for discharge at this time. Return precaution given. Risk of Complications and/or Morbidity of Patient Management:  Patient was discharged risks and benefits of hospitalization were considered     Brian Watkins is a 81 y.o. male who presents to the Emergency Department with chief complaint of  No chief complaint on file. 29-year-old male is here with complaint of right flank pain for the past 2 days. Somewhat colicky. Similar to his prior kidney stone.   He does have history of prostate cancer. [facility] a [facility] here however he has been unable to do so. Stated a lot of it is on him and he just never followed through. He denies any fever. Denies any urinary pain. Denies any tingling, numbness, saddle paresthesia. No testicular swelling. No GI changes. Review of Systems   Constitutional: Negative. Negative for fever. HENT: Negative. Respiratory: Negative. Cardiovascular: Negative. Gastrointestinal: Negative. Genitourinary:  Positive for flank pain. Negative for dysuria. Musculoskeletal:  Negative for back pain. Skin: Negative. Neurological: Negative. Vitals signs and nursing note reviewed. Patient Vitals for the past 4 hrs:   Temp Pulse Resp BP SpO2   03/04/23 0831 -- 82 -- (!) 133/96 96 %   03/04/23 0639 97.7 °F (36.5 °C) 87 18 (!) 175/115 98 %          Physical Exam  Vitals and nursing note reviewed. Constitutional:       Appearance: Normal appearance. HENT:      Head: Normocephalic. Mouth/Throat:      Mouth: Mucous membranes are moist.   Cardiovascular:      Rate and Rhythm: Normal rate. Pulses: Normal pulses. Heart sounds: Normal heart sounds. No murmur heard. Pulmonary:      Effort: Pulmonary effort is normal. No respiratory distress. Breath sounds: Normal breath sounds. No wheezing, rhonchi or rales. Abdominal:      General: Abdomen is flat. Bowel sounds are normal. There is no distension. Palpations: Abdomen is soft. Tenderness: There is no abdominal tenderness. There is no right CVA tenderness or left CVA tenderness. Musculoskeletal:         General: Normal range of motion. Cervical back: Normal range of motion and neck supple. Skin:     General: Skin is warm. Neurological:      General: No focal deficit present. Mental Status: He is alert. Mental status is at baseline. He is disoriented.         Procedures Orders Placed This Encounter   Procedures    CT ABDOMEN PELVIS RENAL STONE    CMP    CBC with Auto Differential    Lipase    Urinalysis w rflx microscopic    [address] [facility], [address]    POCT Urine Dipstick    POCT Urinalysis no Micro        Medications   ondansetron (ZOFRAN) injection 4 mg (4 mg IntraVENous Given 3/4/23 0812)   ketorolac (TORADOL) injection 15 mg (15 mg IntraVENous Given 3/4/23 5231)       Discharge Medication List as of 3/4/2023  9:55 AM           Past Medical History:   Diagnosis Date    Prostate cancer ([facility] [address]         No past surgical history on file. No family history on file. Social History     Socioeconomic History    Marital status: Single   Tobacco Use    Smoking status: Every Day     Packs/day: 0.50     Types: Cigarettes    Smokeless tobacco: Never   Vaping Use    Vaping Use: Never used   Substance and Sexual Activity    Alcohol use:  Yes     Alcohol/week: 1.0 standard drink     Types: 1 Cans of beer per week    Drug use: Never        Allergies: Penicillins    Discharge Medication List as of 3/4/2023  9:55 AM        CONTINUE these medications which have NOT CHANGED    Details   atorvastatin (LIPITOR) 10 MG tablet Take 1 tablet by mouth at bedtime, Disp-30 tablet, R-0Normal      terazosin (HYTRIN) 5 MG capsule Take 1 capsule by mouth nightly, Disp-30 capsule, R-0Normal      lisinopril (PRINIVIL;ZESTRIL) 5 MG tablet Take 1 tablet by mouth in the morning., Disp-30 tablet, R-0Normal      hydrocortisone 1 % ointment APPLY SPARINGLY TO AFFECTED AREA TWICE A DAY, Historical Med      acetaminophen (TYLENOL) 500 MG tablet Take 1 tablet by mouth every 6 hours as needed for Pain, Disp-20 tablet, R-0Print              Results for orders placed or performed during the hospital encounter of 03/04/23   CT ABDOMEN PELVIS RENAL STONE    Narrative    CT ABDOMEN AND PELVIS    INDICATION:  Right flank pain    Multiple axial images were obtained through the abdomen and pelvis without  intravenous or oral contrast.  Radiation dose reduction techniques were used for  this study: All CT scans performed at this facility use one or all of the  following: Automated exposure control, adjustment of the mA and/or kVp according  to patient's size, iterative reconstruction. COMPARISON: None    FINDINGS:  - KIDNEYS/URETERS: Small nonobstructing stones in both kidneys. No  hydronephrosis or significant mass. - BLADDER: Distended, right-sided bladder diverticulum. No bladder stones. - LUNG BASES: No infiltrates or masses. - LIVER: Normal in size and appearance. - GALLBLADDER/BILE DUCTS: No gallstones or bile duct dilatation.  - PANCREAS: Normal.  - SPLEEN: Normal.  - ADRENALS: Normal.    - REPRODUCTIVE ORGANS: No pelvic masses. - BOWEL: Normal caliber. No inflammatory changes.  - LYMPH NODES: No significant retroperitoneal, mesenteric, or pelvic adenopathy.  - BONES: Degenerative changes throughout the lumbar spine. No fracture or  significant bone lesion.  - VASCULATURE: Mild atherosclerosis.  - OTHER: No ascites. Impression    1. Nonobstructing stones in both kidneys. No hydronephrosis. 2.  Bladder distention and bladder diverticulum.       ** If there are any questions about this report, I can be reached on  [employer] or at [phone] **   CMP   Result Value Ref Range    Sodium 135 133 - 143 mmol/L    Potassium 3.8 3.5 - 5.1 mmol/L    Chloride 105 101 - 110 mmol/L    CO2 22 21 - 32 mmol/L    Anion Gap 8 2 - 11 mmol/L    Glucose 113 (H) 65 - 100 mg/dL    BUN 18 8 - 23 MG/DL    Creatinine 1.30 0.8 - 1.5 MG/DL    Est, Glom Filt Rate 59 (L) >60 ml/min/1.73m2    Calcium 9.4 8.3 - 10.4 MG/DL    Total Bilirubin 0.7 0.2 - 1.1 MG/DL    ALT 28 12 - 65 U/L    AST 39 (H) 15 - 37 U/L    Alk Phosphatase 66 50 - 136 U/L    Total Protein 7.1 6.3 - 8.2 g/dL    Albumin 3.7 3.2 - 4.6 g/dL    Globulin 3.4 2.8 - 4.5 g/dL    Albumin/Globulin Ratio 1.1 0.4 - 1.6     CBC with Auto Differential Result Value Ref Range    WBC 10.4 4.3 - 11.1 K/uL    RBC 4.62 4.23 - 5.6 M/uL    Hemoglobin 14.3 13.6 - 17.2 g/dL    Hematocrit 41.6 41.1 - 50.3 %    MCV 90.0 82 - 102 FL    MCH 31.0 26.1 - 32.9 PG    MCHC 34.4 31.4 - 35.0 g/dL    RDW 13.1 11.9 - 14.6 %    Platelets 016 558 - 359 K/uL    MPV 10.8 9.4 - 12.3 FL    nRBC 0.00 0.0 - 0.2 K/uL    Differential Type AUTOMATED      Seg Neutrophils 72 43 - 78 %    Lymphocytes 18 13 - 44 %    Monocytes 8 4.0 - 12.0 %    Eosinophils % 1 0.5 - 7.8 %    Basophils 0 0.0 - 2.0 %    Immature Granulocytes 1 0.0 - 5.0 %    Segs Absolute 7.6 1.7 - 8.2 K/UL    Absolute Lymph # 1.8 0.5 - 4.6 K/UL    Absolute Mono # 0.8 0.1 - 1.3 K/UL    Absolute Eos # 0.1 0.0 - 0.8 K/UL    Basophils Absolute 0.0 0.0 - 0.2 K/UL    Absolute Immature Granulocyte 0.1 0.0 - 0.5 K/UL   Lipase   Result Value Ref Range    Lipase 91 73 - 393 U/L   Urinalysis w rflx microscopic   Result Value Ref Range    Color, UA YELLOW/STRAW      Appearance CLEAR      Specific Gravity, UA 1.017 1.001 - 1.023      pH, Urine 5.5 5.0 - 9.0      Protein, UA TRACE (A) NEG mg/dL    Glucose, UA Negative mg/dL    Ketones, Urine TRACE (A) NEG mg/dL    Bilirubin Urine Negative NEG      Blood, Urine Negative NEG      Urobilinogen, Urine 0.2 0.2 - 1.0 EU/dL    Nitrite, Urine Negative NEG      Leukocyte Esterase, Urine Negative NEG      WBC, UA 0-3 0 /hpf    RBC, UA 0-3 0 /hpf    Epithelial Cells UA 0-3 0 /hpf    BACTERIA, URINE 0 0 /hpf    Other observations RESULTS VERIFIED MANUALLY     POCT Urinalysis no Micro   Result Value Ref Range    Specific Gravity, Urine, POC 1.020 1.001 - 1.023      pH, Urine, POC 5.5 5.0 - 9.0      Protein, Urine, POC TRACE (A) NEG mg/dL    Glucose, UA POC Negative NEG mg/dL    Ketones, Urine, POC Negative NEG mg/dL    Bilirubin, Urine, POC Negative NEG      Blood, UA POC Trace Intact (A) NEG      URINE UROBILINOGEN POC 0.2 0.2 - 1.0 EU/dL    Nitrite, Urine, POC Negative NEG      Leukocyte Est, UA POC Negative NEG      Performed by: Meagn Ryan         CT ABDOMEN PELVIS RENAL STONE   Final Result   1. Nonobstructing stones in both kidneys. No hydronephrosis. 2.  Bladder distention and bladder diverticulum. ** If there are any questions about this report, I can be reached on   [employer] or at [phone] **                        Voice dictation software was used during the making of this note. This software is not perfect and grammatical and other typographical errors may be present. This note has not been completely proofread for errors.      Kulwant Espinoza MD  03/04/23 1163

## 2023-03-04 NOTE — ED NOTES
I have reviewed discharge instructions with the patient. The patient verbalized understanding. Patient left ED via Discharge Method: ambulatory to Home with SELF. Opportunity for questions and clarification provided. Patient given 0 scripts. To continue your aftercare when you leave the hospital, you may receive an automated call from our care team to check in on how you are doing. This is a free service and part of our promise to provide the best care and service to meet your aftercare needs.  If you have questions, or wish to unsubscribe from this service please call [phone]. Thank you for Choosing our [facility] Emergency Department.         Teo Menezes RN  03/04/23 4416

## 2023-03-09 ENCOUNTER — APPOINTMENT (OUTPATIENT)
Dept: GENERAL RADIOLOGY | Age: 73
End: 2023-03-09
Payer: MEDICARE

## 2023-03-09 ENCOUNTER — HOSPITAL ENCOUNTER (EMERGENCY)
Age: 73
Discharge: HOME | End: 2023-03-09
Attending: STUDENT IN AN ORGANIZED HEALTH CARE EDUCATION/TRAINING PROGRAM
Payer: MEDICARE

## 2023-03-09 VITALS
BODY MASS INDEX: 26.68 KG/M2 | WEIGHT: 170 LBS | TEMPERATURE: 97.7 F | DIASTOLIC BLOOD PRESSURE: 95 MMHG | SYSTOLIC BLOOD PRESSURE: 145 MMHG | HEIGHT: 67 IN | OXYGEN SATURATION: 98 % | RESPIRATION RATE: 15 BRPM | HEART RATE: 62 BPM

## 2023-03-09 DIAGNOSIS — R07.9 CHEST PAIN, UNSPECIFIED TYPE: Primary | ICD-10-CM

## 2023-03-09 LAB
ALBUMIN SERPL-MCNC: 2.7 G/DL (ref 3.2–4.6)
ALBUMIN/GLOB SERPL: 1 (ref 0.4–1.6)
ALP SERPL-CCNC: 55 U/L (ref 50–136)
ALT SERPL-CCNC: 30 U/L (ref 12–65)
ANION GAP SERPL CALC-SCNC: 2 MMOL/L (ref 2–11)
AST SERPL-CCNC: 37 U/L (ref 15–37)
BILIRUB SERPL-MCNC: 0.1 MG/DL (ref 0.2–1.1)
BUN SERPL-MCNC: 14 MG/DL (ref 8–23)
CALCIUM SERPL-MCNC: 7.9 MG/DL (ref 8.3–10.4)
CHLORIDE SERPL-SCNC: 111 MMOL/L (ref 101–110)
CO2 SERPL-SCNC: 27 MMOL/L (ref 21–32)
CREAT SERPL-MCNC: 1 MG/DL (ref 0.8–1.5)
EKG ATRIAL RATE: 59 BPM
EKG DIAGNOSIS: NORMAL
EKG P AXIS: 30 DEGREES
EKG P-R INTERVAL: 130 MS
EKG Q-T INTERVAL: 463 MS
EKG QRS DURATION: 95 MS
EKG QTC CALCULATION (BAZETT): 463 MS
EKG R AXIS: 31 DEGREES
EKG T AXIS: 42 DEGREES
EKG VENTRICULAR RATE: 60 BPM
ERYTHROCYTE [DISTWIDTH] IN BLOOD BY AUTOMATED COUNT: 13.3 % (ref 11.9–14.6)
GLOBULIN SER CALC-MCNC: 2.7 G/DL (ref 2.8–4.5)
GLUCOSE SERPL-MCNC: 113 MG/DL (ref 65–100)
HCT VFR BLD AUTO: 36.4 % (ref 41.1–50.3)
HGB BLD-MCNC: 12.3 G/DL (ref 13.6–17.2)
LIPASE SERPL-CCNC: 142 U/L (ref 73–393)
MAGNESIUM SERPL-MCNC: 2.1 MG/DL (ref 1.8–2.4)
MCH RBC QN AUTO: 30.4 PG (ref 26.1–32.9)
MCHC RBC AUTO-ENTMCNC: 33.8 G/DL (ref 31.4–35)
MCV RBC AUTO: 90.1 FL (ref 82–102)
NRBC # BLD: 0 K/UL (ref 0–0.2)
PLATELET # BLD AUTO: 180 K/UL (ref 150–450)
PMV BLD AUTO: 11.1 FL (ref 9.4–12.3)
POTASSIUM SERPL-SCNC: 3.4 MMOL/L (ref 3.5–5.1)
PROT SERPL-MCNC: 5.4 G/DL (ref 6.3–8.2)
RBC # BLD AUTO: 4.04 M/UL (ref 4.23–5.6)
SODIUM SERPL-SCNC: 140 MMOL/L (ref 133–143)
TROPONIN I SERPL HS-MCNC: 8.5 PG/ML (ref 0–14)
TROPONIN I SERPL HS-MCNC: 8.8 PG/ML (ref 0–14)
WBC # BLD AUTO: 6.5 K/UL (ref 4.3–11.1)

## 2023-03-09 PROCEDURE — 84484 ASSAY OF TROPONIN QUANT: CPT

## 2023-03-09 PROCEDURE — 85027 COMPLETE CBC AUTOMATED: CPT

## 2023-03-09 PROCEDURE — 71045 X-RAY EXAM CHEST 1 VIEW: CPT

## 2023-03-09 PROCEDURE — 83735 ASSAY OF MAGNESIUM: CPT

## 2023-03-09 PROCEDURE — 80053 COMPREHEN METABOLIC PANEL: CPT

## 2023-03-09 PROCEDURE — 93005 ELECTROCARDIOGRAM TRACING: CPT | Performed by: EMERGENCY MEDICINE

## 2023-03-09 PROCEDURE — 99285 EMERGENCY DEPT VISIT HI MDM: CPT

## 2023-03-09 PROCEDURE — 83690 ASSAY OF LIPASE: CPT

## 2023-03-09 ASSESSMENT — PAIN DESCRIPTION - LOCATION: LOCATION: CHEST

## 2023-03-09 ASSESSMENT — PAIN - FUNCTIONAL ASSESSMENT: PAIN_FUNCTIONAL_ASSESSMENT: 0-10

## 2023-03-09 ASSESSMENT — LIFESTYLE VARIABLES
HOW OFTEN DO YOU HAVE A DRINK CONTAINING ALCOHOL: NEVER
HOW MANY STANDARD DRINKS CONTAINING ALCOHOL DO YOU HAVE ON A TYPICAL DAY: PATIENT DOES NOT DRINK

## 2023-03-09 ASSESSMENT — PAIN SCALES - GENERAL: PAINLEVEL_OUTOF10: 6

## 2023-03-09 ASSESSMENT — PAIN DESCRIPTION - PAIN TYPE: TYPE: ACUTE PAIN

## 2023-03-09 NOTE — ED NOTES
I have reviewed discharge instructions with the patient. The patient verbalized understanding. Patient left ED via Discharge Method: Lyft transport to the [facility] kitchen on [facility]. Opportunity for questions and clarification provided. Patient given 0 scripts. To continue your aftercare when you leave the hospital, you may receive an automated call from our care team to check in on how you are doing. This is a free service and part of our promise to provide the best care and service to meet your aftercare needs.  If you have questions, or wish to unsubscribe from this service please call [phone]. Thank you for Choosing our [facility] Emergency Department.        Candice Barone RN  03/09/23 1038

## 2023-03-09 NOTE — DISCHARGE INSTRUCTIONS
Alternate Tylenol Motrin as needed for chest discomfort. Follow-up with primary care physician within 1 week. Return to the ER for worsening or worrisome symptoms.

## 2023-03-09 NOTE — ED PROVIDER NOTES
Emergency Department Provider Note                   PCP:                On File Not (Inactive)               Age: 81 y.o. Sex: male     DISPOSITION Decision To Discharge 03/09/2023 08:58:19 AM       ICD-10-CM    1. Chest pain, unspecified type  R07.9           MEDICAL DECISION MAKING  Complexity of Problems Addressed:  1 or more acute illnesses that pose a threat to life or bodily function. Nonspecific chest pain require work-up to rule out emergent etiology    Data Reviewed and Analyzed:  Category 1:   I reviewed records from an external source: ED records from outside this hospital.  I ordered each unique test.  I reviewed the results of each unique test.        Category 2:   ED EKG was independently interpreted in the absence of a cardiologist.  Rate: 60   EKG Interpretation: EKG Interpretation: sinus rhythm  ST Segments: Normal ST segments - NO STEMI    I independently ordered and reviewed the X-rays. No pneumothorax    Category 3: Discussion of management or test interpretation. 81-year-old male presents to the emergency department complaining of retrosternal chest pain. Minimal discomfort at this time per patient. He is in no distress. Will obtain a broad-based work-up. Lab work shows normal white count, stable H&H, normal electrolytes and kidney function, normal LFTs, Normal troponin x2, normal lipase magnesium and chest x-ray. Will discharge home, advised to alternate Tylenol and Motrin as needed for discomfort. Given strict return precautions. Patient voiced understanding and agreement this plan. Risk of Complications and/or Morbidity of Patient Management:  OTC drug management performed     Is this patient to be included in the SEP-1 core measure due to severe sepsis or septic shock? No Exclusion criteria - the patient is NOT to be included for SEP-1 Core Measure due to:  Infection is not suspected     Jerome Anaya is a 81 y.o. male who presents to the Emergency Department with chief complaint of    Chief Complaint   Patient presents with    Chest Pain      77-year-old male history of hypertension hyperlipidemia who is a homeless  presents emergency department after experiencing chest pain that woke him up 2 to 3 hours prior to arrival.  States he was sleeping on a bench and woke up with retrosternal chest discomfort. Describes as a tightness. Does not radiate. No worsening or improving factors. Reports nausea but no vomiting. Denies shortness of breath, cough or congestion. Denies swelling lower extremities. Denies history of CAD. Patient states he walked to the median of the road so that he could find help to bring him to the hospital.       Review of Systems    Vitals signs and nursing note reviewed. Patient Vitals for the past 4 hrs:   Temp Pulse Resp BP SpO2   03/09/23 0645 -- 60 12 (!) 139/98 96 %   03/09/23 0621 97.7 °F (36.5 °C) 66 16 (!) 137/105 96 %          Physical Exam  Vitals and nursing note reviewed. Constitutional:       General: He is not in acute distress. Appearance: Normal appearance. HENT:      Head: Normocephalic. Nose: Nose normal.      Mouth/Throat:      Mouth: Mucous membranes are moist.   Eyes:      Extraocular Movements: Extraocular movements intact. Cardiovascular:      Rate and Rhythm: Normal rate and regular rhythm. Pulses: Normal pulses. Heart sounds: Normal heart sounds. Pulmonary:      Effort: Pulmonary effort is normal. No respiratory distress. Breath sounds: Normal breath sounds. Abdominal:      General: Abdomen is flat. Palpations: Abdomen is soft. Tenderness: There is no abdominal tenderness. Musculoskeletal:         General: No swelling. Normal range of motion. Cervical back: Normal range of motion. No rigidity. Skin:     General: Skin is warm. Capillary Refill: Capillary refill takes less than 2 seconds. Findings: No rash.    Neurological:      General: No focal deficit present. Mental Status: He is alert and oriented to person, place, and time. Psychiatric:         Mood and Affect: Mood normal.        Procedures          Orders Placed This Encounter   Procedures    XR CHEST PORTABLE    CBC    Comprehensive Metabolic Panel    Troponin    Lipase    Magnesium    Cardiac Monitor    Pulse Oximetry    EKG 12 Lead    Saline lock IV        Medications - No data to display    New Prescriptions    No medications on file        Past Medical History:   Diagnosis Date    Prostate cancer ([address]         No past surgical history on file. No family history on file. Social History     Socioeconomic History    Marital status: Single   Tobacco Use    Smoking status: Every Day     Packs/day: 0.50     Types: Cigarettes    Smokeless tobacco: Never   Vaping Use    Vaping Use: Never used   Substance and Sexual Activity    Alcohol use: Yes     Alcohol/week: 1.0 standard drink     Types: 1 Cans of beer per week    Drug use: Never        Allergies: Penicillins    Previous Medications    ACETAMINOPHEN (TYLENOL) 500 MG TABLET    Take 1 tablet by mouth every 6 hours as needed for Pain    ATORVASTATIN (LIPITOR) 10 MG TABLET    Take 1 tablet by mouth at bedtime    HYDROCORTISONE 1 % OINTMENT    APPLY SPARINGLY TO AFFECTED AREA TWICE A DAY    LISINOPRIL (PRINIVIL;ZESTRIL) 5 MG TABLET    Take 1 tablet by mouth in the morning. TERAZOSIN (HYTRIN) 5 MG CAPSULE    Take 1 capsule by mouth nightly        Results for orders placed or performed during the hospital encounter of 03/09/23   XR CHEST PORTABLE    Narrative    EXAMINATION: XR CHEST PORTABLE      DATE OF EXAM: 3/9/2023 6:25 AM    HISTORY: Chest Pain    COMPARISON: None. FINDINGS:     The lungs are clear.  The cardiomediastinal silhouette, bones, soft tissues and   upper abdomen are normal.        Impression    Normal chest        Lindalou Mohs, M.D.   3/9/2023 6:56:00 AM   CBC   Result Value Ref Range    WBC 6.5 4.3 - 11.1 K/uL    RBC 4.04 (L) 4.23 - 5.6 M/uL    Hemoglobin 12.3 (L) 13.6 - 17.2 g/dL    Hematocrit 36.4 (L) 41.1 - 50.3 %    MCV 90.1 82 - 102 FL    MCH 30.4 26.1 - 32.9 PG    MCHC 33.8 31.4 - 35.0 g/dL    RDW 13.3 11.9 - 14.6 %    Platelets 702 664 - 657 K/uL    MPV 11.1 9.4 - 12.3 FL    nRBC 0.00 0.0 - 0.2 K/uL   Comprehensive Metabolic Panel   Result Value Ref Range    Sodium 140 133 - 143 mmol/L    Potassium 3.4 (L) 3.5 - 5.1 mmol/L    Chloride 111 (H) 101 - 110 mmol/L    CO2 27 21 - 32 mmol/L    Anion Gap 2 2 - 11 mmol/L    Glucose 113 (H) 65 - 100 mg/dL    BUN 14 8 - 23 MG/DL    Creatinine 1.00 0.8 - 1.5 MG/DL    Est, Glom Filt Rate >60 >60 ml/min/1.73m2    Calcium 7.9 (L) 8.3 - 10.4 MG/DL    Total Bilirubin 0.1 (L) 0.2 - 1.1 MG/DL    ALT 30 12 - 65 U/L    AST 37 15 - 37 U/L    Alk Phosphatase 55 50 - 136 U/L    Total Protein 5.4 (L) 6.3 - 8.2 g/dL    Albumin 2.7 (L) 3.2 - 4.6 g/dL    Globulin 2.7 (L) 2.8 - 4.5 g/dL    Albumin/Globulin Ratio 1.0 0.4 - 1.6     Troponin   Result Value Ref Range    Troponin, High Sensitivity 8.5 0 - 14 pg/mL   Troponin   Result Value Ref Range    Troponin, High Sensitivity 8.8 0 - 14 pg/mL   Lipase   Result Value Ref Range    Lipase 142 73 - 393 U/L   Magnesium   Result Value Ref Range    Magnesium 2.1 1.8 - 2.4 mg/dL   EKG 12 Lead   Result Value Ref Range    Ventricular Rate 60 BPM    Atrial Rate 59 BPM    P-R Interval 130 ms    QRS Duration 95 ms    Q-T Interval 463 ms    QTc Calculation (Bazett) 463 ms    P Axis 30 degrees    R Axis 31 degrees    T Axis 42 degrees    Diagnosis Sinus rhythm         XR CHEST PORTABLE   Final Result   Normal chest           Loyda Wallace M.D.    3/9/2023 6:56:00 AM                        Voice dictation software was used during the making of this note. This software is not perfect and grammatical and other typographical errors may be present. This note has not been completely proofread for errors.      Shade Celeste DO  03/09/23 0737

## 2023-03-09 NOTE — ED TRIAGE NOTES
[place] police [place] him [place] the middle of the road c\o chest pain points to his heart when asked where the pain is     EMS started IV 20g in right ac   EMS gave 1 sl o.4 ntg with relief pt sleeping currently on stretcher  EMS also gave 324mg asa po

## 2023-03-27 ENCOUNTER — HOSPITAL ENCOUNTER (EMERGENCY)
Age: 73
Discharge: HOME | End: 2023-03-27
Attending: EMERGENCY MEDICINE
Payer: MEDICARE

## 2023-03-27 VITALS
DIASTOLIC BLOOD PRESSURE: 92 MMHG | OXYGEN SATURATION: 97 % | HEIGHT: 67 IN | BODY MASS INDEX: 26.68 KG/M2 | TEMPERATURE: 97.5 F | RESPIRATION RATE: 16 BRPM | HEART RATE: 83 BPM | SYSTOLIC BLOOD PRESSURE: 122 MMHG | WEIGHT: 170 LBS

## 2023-03-27 DIAGNOSIS — R09.81 NASAL CONGESTION: Primary | ICD-10-CM

## 2023-03-27 DIAGNOSIS — G89.29 CHRONIC RIGHT-SIDED LOW BACK PAIN WITHOUT SCIATICA: ICD-10-CM

## 2023-03-27 DIAGNOSIS — M54.50 CHRONIC RIGHT-SIDED LOW BACK PAIN WITHOUT SCIATICA: ICD-10-CM

## 2023-03-27 PROCEDURE — 6370000000 HC RX 637 (ALT 250 FOR IP): Performed by: EMERGENCY MEDICINE

## 2023-03-27 PROCEDURE — 99284 EMERGENCY DEPT VISIT MOD MDM: CPT

## 2023-03-27 RX ORDER — CYCLOBENZAPRINE HCL 10 MG
10 TABLET ORAL 3 TIMES DAILY PRN
Qty: 13 TABLET | Refills: 0 | Status: SHIPPED | OUTPATIENT
Start: 2023-03-27 | End: 2023-04-06

## 2023-03-27 RX ORDER — OXYMETAZOLINE HYDROCHLORIDE 0.05 G/100ML
2 SPRAY NASAL
Status: COMPLETED | OUTPATIENT
Start: 2023-03-27 | End: 2023-03-27

## 2023-03-27 RX ORDER — OXYMETAZOLINE HYDROCHLORIDE 0.05 G/100ML
2 SPRAY NASAL 2 TIMES DAILY
Qty: 15 ML | Refills: 3 | Status: SHIPPED | OUTPATIENT
Start: 2023-03-27 | End: 2023-04-26

## 2023-03-27 RX ADMIN — OXYMETAZOLINE HCL 2 SPRAY: 0.05 SPRAY NASAL at 05:04

## 2023-03-27 ASSESSMENT — ENCOUNTER SYMPTOMS
ABDOMINAL PAIN: 0
BACK PAIN: 1
ABDOMINAL SWELLING: 0

## 2023-03-27 NOTE — ED NOTES
I have reviewed discharge instructions with the patient. The patient verbalized understanding. Patient left ED via Discharge Method: ambulatory to Home with (self  Opportunity for questions and clarification provided. Patient given 2 scripts. Pt in no acute distress at discharge     To continue your aftercare when you leave the hospital, you may receive an automated call from our care team to check in on how you are doing. This is a free service and part of our promise to provide the best care and service to meet your aftercare needs.  If you have questions, or wish to unsubscribe from this service please call [phone]. Thank you for Choosing our [facility] Emergency Department.        Keri Dickinson RN  03/27/23 1493 [facility] Santi Marie RN  03/27/23 7395

## 2023-03-27 NOTE — ED PROVIDER NOTES
complaints.  States pain is worse with movement and certain positions.  Denies any bloody urine.  Denies any difficulty urinating    The history is provided by the patient and the EMS personnel.   Back Pain  Location:  Lumbar spine  Quality:  Aching  Pain severity:  Moderate  Onset quality:  Gradual  Duration:  4 weeks  Progression:  Worsening  Chronicity:  New  Context: recent injury and twisting    Context: not falling, not MCA and not MVA    Relieved by:  Nothing  Associated symptoms: no abdominal pain, no abdominal swelling, no fever, no leg pain and no tingling       Vitals signs and nursing note reviewed.   Patient Vitals for the past 4 hrs:   Temp Pulse Resp BP SpO2   03/27/23 0421 97.5 °F (36.4 °C) 83 16 (!) 122/92 97 %        Physical Exam  Vitals and nursing note reviewed.   Constitutional:       General: He is not in acute distress.     Appearance: Normal appearance. He is not ill-appearing.   HENT:      Head: Normocephalic and atraumatic.      Right Ear: External ear normal.      Nose: Congestion present.   Eyes:      General:         Right eye: No discharge.         Left eye: No discharge.      Extraocular Movements: Extraocular movements intact.      Pupils: Pupils are equal, round, and reactive to light.   Cardiovascular:      Rate and Rhythm: Normal rate and regular rhythm.      Pulses: Normal pulses.      Heart sounds: Normal heart sounds.   Pulmonary:      Effort: Pulmonary effort is normal.      Breath sounds: Normal breath sounds.   Abdominal:      General: Abdomen is flat. There is no distension.      Palpations: Abdomen is soft. There is no mass.      Tenderness: There is no abdominal tenderness.   Musculoskeletal:         General: No swelling or deformity.      Cervical back: Normal range of motion and neck supple.      Lumbar back: Tenderness present.        Back:    Skin:     Capillary Refill: Capillary refill takes less than 2 seconds.      Coloration: Skin is not jaundiced or pale.

## 2023-03-27 NOTE — ED TRIAGE NOTES
Pt c\o congestion states his sinuses are all messed  and he is having trouble sleeping also has a non productive cough also states that he needs his prostrate cancer taken care of     Also having lower pain on the right when he fell off a wall 2 weeks ago

## 2023-04-19 ENCOUNTER — HOSPITAL ENCOUNTER (EMERGENCY)
Age: 73
Discharge: HOME | End: 2023-04-19
Attending: EMERGENCY MEDICINE
Payer: MEDICARE

## 2023-04-19 ENCOUNTER — APPOINTMENT (OUTPATIENT)
Dept: GENERAL RADIOLOGY | Age: 73
End: 2023-04-19
Payer: MEDICARE

## 2023-04-19 VITALS
BODY MASS INDEX: 27.47 KG/M2 | WEIGHT: 175 LBS | HEIGHT: 67 IN | TEMPERATURE: 98.4 F | HEART RATE: 79 BPM | SYSTOLIC BLOOD PRESSURE: 133 MMHG | RESPIRATION RATE: 16 BRPM | OXYGEN SATURATION: 98 % | DIASTOLIC BLOOD PRESSURE: 61 MMHG

## 2023-04-19 DIAGNOSIS — T67.5XXA HEAT EXHAUSTION, INITIAL ENCOUNTER: ICD-10-CM

## 2023-04-19 DIAGNOSIS — R06.02 SHORTNESS OF BREATH: Primary | ICD-10-CM

## 2023-04-19 LAB
ALBUMIN SERPL-MCNC: 3 G/DL (ref 3.2–4.6)
ALBUMIN/GLOB SERPL: 1 (ref 0.4–1.6)
ALP SERPL-CCNC: 58 U/L (ref 50–136)
ALT SERPL-CCNC: 46 U/L (ref 12–65)
ANION GAP SERPL CALC-SCNC: 7 MMOL/L (ref 2–11)
AST SERPL-CCNC: 73 U/L (ref 15–37)
BASOPHILS # BLD: 0 K/UL (ref 0–0.2)
BASOPHILS NFR BLD: 0 % (ref 0–2)
BILIRUB SERPL-MCNC: 0.9 MG/DL (ref 0.2–1.1)
BUN SERPL-MCNC: 40 MG/DL (ref 8–23)
CALCIUM SERPL-MCNC: 8.3 MG/DL (ref 8.3–10.4)
CHLORIDE SERPL-SCNC: 106 MMOL/L (ref 101–110)
CO2 SERPL-SCNC: 23 MMOL/L (ref 21–32)
CREAT SERPL-MCNC: 1.4 MG/DL (ref 0.8–1.5)
DIFFERENTIAL METHOD BLD: ABNORMAL
EKG ATRIAL RATE: 78 BPM
EKG DIAGNOSIS: NORMAL
EKG P AXIS: 37 DEGREES
EKG P-R INTERVAL: 124 MS
EKG Q-T INTERVAL: 423 MS
EKG QRS DURATION: 96 MS
EKG QTC CALCULATION (BAZETT): 479 MS
EKG R AXIS: 55 DEGREES
EKG T AXIS: 63 DEGREES
EKG VENTRICULAR RATE: 77 BPM
EOSINOPHIL # BLD: 0.2 K/UL (ref 0–0.8)
EOSINOPHIL NFR BLD: 3 % (ref 0.5–7.8)
ERYTHROCYTE [DISTWIDTH] IN BLOOD BY AUTOMATED COUNT: 13.2 % (ref 11.9–14.6)
GLOBULIN SER CALC-MCNC: 2.9 G/DL (ref 2.8–4.5)
GLUCOSE SERPL-MCNC: 97 MG/DL (ref 65–100)
HCT VFR BLD AUTO: 34.9 % (ref 41.1–50.3)
HGB BLD-MCNC: 11.9 G/DL (ref 13.6–17.2)
IMM GRANULOCYTES # BLD AUTO: 0.1 K/UL (ref 0–0.5)
IMM GRANULOCYTES NFR BLD AUTO: 1 % (ref 0–5)
LYMPHOCYTES # BLD: 1.8 K/UL (ref 0.5–4.6)
LYMPHOCYTES NFR BLD: 21 % (ref 13–44)
MAGNESIUM SERPL-MCNC: 2.3 MG/DL (ref 1.8–2.4)
MCH RBC QN AUTO: 30.1 PG (ref 26.1–32.9)
MCHC RBC AUTO-ENTMCNC: 34.1 G/DL (ref 31.4–35)
MCV RBC AUTO: 88.4 FL (ref 82–102)
MONOCYTES # BLD: 0.6 K/UL (ref 0.1–1.3)
MONOCYTES NFR BLD: 8 % (ref 4–12)
NEUTS SEG # BLD: 5.7 K/UL (ref 1.7–8.2)
NEUTS SEG NFR BLD: 67 % (ref 43–78)
NRBC # BLD: 0 K/UL (ref 0–0.2)
NT PRO BNP: 153 PG/ML (ref 5–125)
PLATELET # BLD AUTO: 243 K/UL (ref 150–450)
PMV BLD AUTO: 10.5 FL (ref 9.4–12.3)
POTASSIUM SERPL-SCNC: 3.5 MMOL/L (ref 3.5–5.1)
PROT SERPL-MCNC: 5.9 G/DL (ref 6.3–8.2)
RBC # BLD AUTO: 3.95 M/UL (ref 4.23–5.6)
SODIUM SERPL-SCNC: 136 MMOL/L (ref 133–143)
TROPONIN I SERPL HS-MCNC: 16 PG/ML (ref 0–57)
WBC # BLD AUTO: 8.4 K/UL (ref 4.3–11.1)

## 2023-04-19 PROCEDURE — 96360 HYDRATION IV INFUSION INIT: CPT

## 2023-04-19 PROCEDURE — 80053 COMPREHEN METABOLIC PANEL: CPT

## 2023-04-19 PROCEDURE — 71045 X-RAY EXAM CHEST 1 VIEW: CPT

## 2023-04-19 PROCEDURE — 73110 X-RAY EXAM OF WRIST: CPT

## 2023-04-19 PROCEDURE — 84484 ASSAY OF TROPONIN QUANT: CPT

## 2023-04-19 PROCEDURE — 99285 EMERGENCY DEPT VISIT HI MDM: CPT

## 2023-04-19 PROCEDURE — 83880 ASSAY OF NATRIURETIC PEPTIDE: CPT

## 2023-04-19 PROCEDURE — 85025 COMPLETE CBC W/AUTO DIFF WBC: CPT

## 2023-04-19 PROCEDURE — 83735 ASSAY OF MAGNESIUM: CPT

## 2023-04-19 PROCEDURE — 2580000003 HC RX 258: Performed by: EMERGENCY MEDICINE

## 2023-04-19 PROCEDURE — 93005 ELECTROCARDIOGRAM TRACING: CPT | Performed by: EMERGENCY MEDICINE

## 2023-04-19 RX ORDER — SODIUM CHLORIDE, SODIUM LACTATE, POTASSIUM CHLORIDE, AND CALCIUM CHLORIDE .6; .31; .03; .02 G/100ML; G/100ML; G/100ML; G/100ML
1000 INJECTION, SOLUTION INTRAVENOUS ONCE
Status: COMPLETED | OUTPATIENT
Start: 2023-04-19 | End: 2023-04-19

## 2023-04-19 RX ADMIN — SODIUM CHLORIDE, POTASSIUM CHLORIDE, SODIUM LACTATE AND CALCIUM CHLORIDE 1000 ML: 600; 310; 30; 20 INJECTION, SOLUTION INTRAVENOUS at 18:41

## 2023-04-19 ASSESSMENT — ENCOUNTER SYMPTOMS
WHEEZING: 1
VOMITING: 0
COUGH: 0
ABDOMINAL PAIN: 0
SHORTNESS OF BREATH: 1

## 2023-04-19 ASSESSMENT — PAIN DESCRIPTION - LOCATION: LOCATION: WRIST;BACK

## 2023-04-19 ASSESSMENT — PAIN DESCRIPTION - ORIENTATION: ORIENTATION: RIGHT;LOWER

## 2023-04-19 ASSESSMENT — PAIN SCALES - GENERAL: PAINLEVEL_OUTOF10: 7

## 2023-04-19 ASSESSMENT — PAIN - FUNCTIONAL ASSESSMENT: PAIN_FUNCTIONAL_ASSESSMENT: 0-10

## 2023-04-19 NOTE — DISCHARGE INSTRUCTIONS
Recheck via clinic to see status of potential surgery for prostate issues. Try to decrease smoking. Also recheck with [place] clinic to see if you have any wheezing and to see if an inhaler would be helpful. Recheck sooner for worse or worrisome symptoms.

## 2023-04-19 NOTE — ED TRIAGE NOTES
Pt arrives from a business near [facility] via EMS. Pt reports a fall yesterday and shortness of breath today. Pt denies LOC or hitting his head. Pt reports right wrist pain.     /90, HR 80, , T 97.9, 02 97 RA

## 2023-04-19 NOTE — ED NOTES
[address] [facility] per pts request to ask them not to throw out his belongings while he is in the hospital.     Karli To RN  04/19/23 1655

## 2023-04-19 NOTE — ED PROVIDER NOTES
2.4 mg/dL   Troponin   Result Value Ref Range    Troponin, High Sensitivity 16.0 0 - 57 pg/mL   Brain Natriuretic Peptide   Result Value Ref Range    NT Pro- (H) 5 - 125 PG/ML   EKG 12 Lead   Result Value Ref Range    Ventricular Rate 77 BPM    Atrial Rate 78 BPM    P-R Interval 124 ms    QRS Duration 96 ms    Q-T Interval 423 ms    QTc Calculation (Bazett) 479 ms    P Axis 37 degrees    R Axis 55 degrees    T Axis 63 degrees    Diagnosis       Sinus rhythm  Borderline prolonged QT interval  Baseline wander in lead(s) V2    Confirmed by LOLA WILKINS (), KARENA JOSÉ ([id number]) on 4/19/2023 9:57:10 PM          XR CHEST PORTABLE   Final Result      Mild cardiomegaly without visible infiltrate. Heide Urena M.D.    4/19/2023 6:26:00 PM      XR WRIST RIGHT (MIN 3 VIEWS)   Final Result      Degenerative changes without displaced fracture seen. Serge Amaya M.D.    4/19/2023 6:29:00 PM                        Voice dictation software was used during the making of this note. This software is not perfect and grammatical and other typographical errors may be present. This note has not been completely proofread for errors.      Kristen Ariza MD  04/19/23 7728

## 2023-05-01 ENCOUNTER — HOSPITAL ENCOUNTER (EMERGENCY)
Age: 73
Discharge: HOME | End: 2023-05-02
Payer: MEDICARE

## 2023-05-01 DIAGNOSIS — S39.012A STRAIN OF LUMBAR REGION, INITIAL ENCOUNTER: Primary | ICD-10-CM

## 2023-05-01 DIAGNOSIS — S22.080G COMPRESSION FRACTURE OF T12 VERTEBRA WITH DELAYED HEALING, SUBSEQUENT ENCOUNTER: ICD-10-CM
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
## 2023-05-01 PROCEDURE — 99284 EMERGENCY DEPT VISIT MOD MDM: CPT

## 2023-05-01 RX ORDER — CYCLOBENZAPRINE HCL 10 MG
10 TABLET ORAL
Status: DISCONTINUED | OUTPATIENT
Start: 2023-05-02 | End: 2023-05-02

## 2023-05-01 RX ORDER — CYCLOBENZAPRINE HCL 10 MG
10 TABLET ORAL
Status: COMPLETED | OUTPATIENT
Start: 2023-05-02 | End: 2023-05-02

## 2023-05-01 RX ORDER — IBUPROFEN 600 MG/1
600 TABLET ORAL 4 TIMES DAILY PRN
Qty: 20 TABLET | Refills: 0 | Status: SHIPPED | OUTPATIENT
Start: 2023-05-01

## 2023-05-01 RX ORDER — IBUPROFEN 600 MG/1
600 TABLET ORAL
Status: COMPLETED | OUTPATIENT
Start: 2023-05-02 | End: 2023-05-02

## 2023-05-01 ASSESSMENT — PAIN SCALES - GENERAL: PAINLEVEL_OUTOF10: 9

## 2023-05-01 ASSESSMENT — PAIN - FUNCTIONAL ASSESSMENT: PAIN_FUNCTIONAL_ASSESSMENT: 0-10

## 2023-05-02 ENCOUNTER — APPOINTMENT (OUTPATIENT)
Dept: CT IMAGING | Age: 73
End: 2023-05-02
Payer: MEDICARE

## 2023-05-02 ENCOUNTER — APPOINTMENT (OUTPATIENT)
Dept: GENERAL RADIOLOGY | Age: 73
End: 2023-05-02
Payer: MEDICARE

## 2023-05-02 VITALS
WEIGHT: 175 LBS | HEIGHT: 67 IN | BODY MASS INDEX: 27.47 KG/M2 | OXYGEN SATURATION: 98 % | TEMPERATURE: 98 F | SYSTOLIC BLOOD PRESSURE: 149 MMHG | RESPIRATION RATE: 18 BRPM | HEART RATE: 76 BPM | DIASTOLIC BLOOD PRESSURE: 86 MMHG

## 2023-05-02 PROCEDURE — 72131 CT LUMBAR SPINE W/O DYE: CPT

## 2023-05-02 PROCEDURE — 72100 X-RAY EXAM L-S SPINE 2/3 VWS: CPT

## 2023-05-02 PROCEDURE — 6370000000 HC RX 637 (ALT 250 FOR IP)

## 2023-05-02 RX ORDER — GABAPENTIN 100 MG/1
100 CAPSULE ORAL 2 TIMES DAILY
Qty: 24 CAPSULE | Refills: 0 | Status: SHIPPED | OUTPATIENT
Start: 2023-05-02 | End: 2023-05-14

## 2023-05-02 RX ORDER — LIDOCAINE 50 MG/G
1 PATCH TOPICAL DAILY
Qty: 10 PATCH | Refills: 0 | Status: SHIPPED | OUTPATIENT
Start: 2023-05-02 | End: 2023-05-12

## 2023-05-02 RX ADMIN — CYCLOBENZAPRINE 10 MG: 10 TABLET, FILM COATED ORAL at 00:16

## 2023-05-02 RX ADMIN — IBUPROFEN 600 MG: 600 TABLET, FILM COATED ORAL at 00:15

## 2023-05-02 NOTE — ED NOTES
Arranged rideshare service to transport patient to [address] [place] [address].  ETA: 5:00 AM.     Audrey Dinero  05/02/23 0458

## 2023-05-02 NOTE — ED NOTES
I have reviewed discharge instructions with the patient. The patient verbalized understanding. Patient left ED via Discharge Method: ambulatory to Home with self. Opportunity for questions and clarification provided. Patient given 3 scripts. To continue your aftercare when you leave the hospital, you may receive an automated call from our care team to check in on how you are doing. This is a free service and part of our promise to provide the best care and service to meet your aftercare needs.  If you have questions, or wish to unsubscribe from this service please call [phone]. Thank you for Choosing our [facility] Emergency Department.         Gracia Cao RN  05/02/23 8438

## 2023-05-02 NOTE — ED PROVIDER NOTES
vertebral bodies. Levoscoliosis. Mild retrolisthesis of L2, L2, L2-L3, L3-L4. Age-indeterminate but chronic appearing compression fracture of T12 with 4 mm   retropulsion of superior endplate which results in moderate osseous spinal canal   narrowing. 50% height loss. The osseous spinal canal measures 8 mm in AP   dimension. There is also chronic vertically oriented fracture in the right T12   lamina, unchanged. T12 was partially visualized on the CT abdomen pelvis dated   March 4, 2023 and appears grossly similar. The superior endplate of C13 and   retropulsed superior endplate were beyond the field-of-view on the prior exam.       Osteopenia. Degenerative changes in the form of disc height loss, degenerative   endplate sclerosis and subchondral cystlike change of the vertebral body   endplates, and facet arthropath. T11-T12: Chronic compression fracture superior endplate of C15 with 4 mm   retropulsion moderate spinal canal narrowing. The osseous spinal canal measures   8 mm in AP dimension. Severe bilateral foraminal narrowing. T12-L1: Disc bulge. Facet arthropathy. Severe right and moderate to severe left   foraminal narrowing. Moderate spinal canal narrowing. L1-L2: Disc bulge. Facet arthropathy. Moderate to severe bilateral foraminal   narrowing. Moderate spinal canal narrowing. L2-L3: Disc bulge. Facet arthropathy. Moderate bilateral foraminal narrowing. Moderate spinal canal narrowing. L3-L4: Disc bulge. Facet arthropathy. Moderate bilateral foraminal narrowing. Moderate spinal canal narrowing. L4-L5: Disc bulge. Facet arthropathy. Moderate bilateral foraminal narrowing. Mild spinal canal narrowing. L5-S1: Facet arthropathy. Mild bilateral foraminal narrowing. No significant   spinal canal narrowing. Soft tissues:    Imaged portions of the abdomen and pelvis are unremarkable. Atrophy of the   posterior paraspinal muscles. Impression    1.   Age-indeterminate but

## 2023-05-04 ENCOUNTER — OFFICE VISIT (OUTPATIENT)
Dept: FAMILY MEDICINE CLINIC | Age: 73
End: 2023-05-04

## 2023-05-04 VITALS
WEIGHT: 176 LBS | BODY MASS INDEX: 27.62 KG/M2 | DIASTOLIC BLOOD PRESSURE: 66 MMHG | HEIGHT: 67 IN | HEART RATE: 77 BPM | RESPIRATION RATE: 16 BRPM | OXYGEN SATURATION: 97 % | SYSTOLIC BLOOD PRESSURE: 97 MMHG | TEMPERATURE: 98.4 F

## 2023-05-04 DIAGNOSIS — Z59.82 LACK OF ACCESS TO TRANSPORTATION: ICD-10-CM

## 2023-05-04 DIAGNOSIS — Z59.02 UNSHELTERED HOMELESSNESS: ICD-10-CM

## 2023-05-04 DIAGNOSIS — Z76.89 FREQUENT PATIENT IN EMERGENCY DEPARTMENT: ICD-10-CM

## 2023-05-04 DIAGNOSIS — Z59.89 UNINSURED: ICD-10-CM

## 2023-05-04 DIAGNOSIS — I10 ESSENTIAL HYPERTENSION: Primary | ICD-10-CM

## 2023-05-04 DIAGNOSIS — Z59.9 FINANCIAL DIFFICULTIES: ICD-10-CM

## 2023-05-04 PROBLEM — Z59.00 HOMELESS: Status: ACTIVE | Noted: 2023-03-13

## 2023-05-04 PROBLEM — M54.50 LOW BACK PAIN: Status: ACTIVE | Noted: 2023-03-14

## 2023-05-04 PROBLEM — E78.2 MIXED HYPERLIPIDEMIA: Status: ACTIVE | Noted: 2023-04-11

## 2023-05-04 RX ORDER — CLINDAMYCIN HYDROCHLORIDE 300 MG/1
CAPSULE ORAL
COMMUNITY

## 2023-05-04 RX ORDER — CLINDAMYCIN HYDROCHLORIDE 300 MG/1
CAPSULE ORAL
COMMUNITY
Start: 2023-03-30

## 2023-05-04 SDOH — ECONOMIC STABILITY - INCOME SECURITY: PROBLEM RELATED TO HOUSING AND ECONOMIC CIRCUMSTANCES, UNSPECIFIED: Z59.9

## 2023-05-04 SDOH — ECONOMIC STABILITY - TRANSPORTATION SECURITY: TRANSPORTATION INSECURITY: Z59.82

## 2023-05-04 SDOH — ECONOMIC STABILITY - HOUSING INSECURITY: UNSHELTERED HOMELESSNESS: Z59.02

## 2023-05-04 SDOH — ECONOMIC STABILITY - INCOME SECURITY: OTHER PROBLEMS RELATED TO HOUSING AND ECONOMIC CIRCUMSTANCES: Z59.89

## 2023-05-04 ASSESSMENT — ENCOUNTER SYMPTOMS
SHORTNESS OF BREATH: 0
CHEST TIGHTNESS: 0

## 2023-05-04 NOTE — PROGRESS NOTES
Del Deal (:  1950) is a 72 y.o. male,New patient, here for evaluation of the following chief complaint(s):  Blood Pressure Check        SUBJECTIVE/OBJECTIVE:    HPI:  72 y.o male patient homeless presents today at [facility] for blood pressure check. Patient denies medication refill and denies new concerns otherwise he is doing alright. BP 97/66 (Site: Left Upper Arm, Position: Sitting, Cuff Size: Medium Adult)   Pulse 77   Temp 98.4 °F (36.9 °C) (Temporal)   Resp 16   Ht 5' 7\" (1.702 m)   Wt 176 lb (79.8 kg)   SpO2 97%   BMI 27.57 kg/m²     No results found for: CBC, CMP, LIPIDPAN, TSH, HBA1C     Allergies   Allergen Reactions    Penicillins Itching       Current Outpatient Medications   Medication Sig Dispense Refill    clindamycin (CLEOCIN) 300 MG capsule TAKE 1 CAPSULE BY MOUTH EVERY 6 HOURS      clindamycin (CLEOCIN) 300 MG capsule clindamycin HCl 300 mg capsule   TAKE 1 CAPSULE BY MOUTH EVERY 6 HOURS      diclofenac sodium (VOLTAREN) 1 % GEL       lidocaine (LIDODERM) 5 % Place 1 patch onto the skin daily for 10 days 12 hours on, 12 hours off. 10 patch 0    gabapentin (NEURONTIN) 100 MG capsule Take 1 capsule by mouth 2 times daily for 12 days. Intended supply: 90 days 24 capsule 0    ibuprofen (ADVIL;MOTRIN) 600 MG tablet Take 1 tablet by mouth 4 times daily as needed for Pain 20 tablet 0    atorvastatin (LIPITOR) 10 MG tablet Take 1 tablet by mouth at bedtime 30 tablet 0    terazosin (HYTRIN) 5 MG capsule Take 1 capsule by mouth nightly 30 capsule 0    lisinopril (PRINIVIL;ZESTRIL) 5 MG tablet Take 1 tablet by mouth in the morning. 30 tablet 0    hydrocortisone 1 % ointment APPLY SPARINGLY TO AFFECTED AREA TWICE A DAY      acetaminophen (TYLENOL) 500 MG tablet Take 1 tablet by mouth every 6 hours as needed for Pain 20 tablet 0     No current facility-administered medications for this visit.        Past Medical History:   Diagnosis Date    Prostate cancer ([facility] [address]

## 2023-06-05 ENCOUNTER — HOSPITAL ENCOUNTER (INPATIENT)
Age: 73
LOS: 3 days | Discharge: HOME | End: 2023-06-10
Attending: EMERGENCY MEDICINE | Admitting: INTERNAL MEDICINE
Payer: MEDICARE

## 2023-06-05 DIAGNOSIS — R33.9 URINARY RETENTION: Primary | ICD-10-CM

## 2023-06-05 DIAGNOSIS — Z59.00 HOMELESSNESS: ICD-10-CM

## 2023-06-05 PROCEDURE — 99285 EMERGENCY DEPT VISIT HI MDM: CPT

## 2023-06-05 SDOH — ECONOMIC STABILITY - HOUSING INSECURITY: HOMELESSNESS UNSPECIFIED: Z59.00

## 2023-06-05 ASSESSMENT — ENCOUNTER SYMPTOMS
ABDOMINAL PAIN: 1
ABDOMINAL DISTENTION: 1

## 2023-06-06 ENCOUNTER — APPOINTMENT (OUTPATIENT)
Dept: CT IMAGING | Age: 73
End: 2023-06-06
Payer: MEDICARE

## 2023-06-06 ENCOUNTER — APPOINTMENT (OUTPATIENT)
Dept: ULTRASOUND IMAGING | Age: 73
End: 2023-06-06
Payer: MEDICARE

## 2023-06-06 PROBLEM — F10.11 HISTORY OF ALCOHOL ABUSE: Status: ACTIVE | Noted: 2023-06-06

## 2023-06-06 PROBLEM — F19.10 POLYSUBSTANCE ABUSE (HCC): Status: ACTIVE | Noted: 2023-06-06

## 2023-06-06 PROBLEM — R33.9 URINARY RETENTION: Status: ACTIVE | Noted: 2023-06-06

## 2023-06-06 PROBLEM — F15.10 METHAMPHETAMINE ABUSE (HCC): Status: ACTIVE | Noted: 2023-06-06

## 2023-06-06 LAB
ALBUMIN SERPL-MCNC: 3 G/DL (ref 3.2–4.6)
ALBUMIN/GLOB SERPL: 1.2 (ref 0.4–1.6)
ALP SERPL-CCNC: 64 U/L (ref 50–136)
ALT SERPL-CCNC: 49 U/L (ref 12–65)
AMPHET UR QL SCN: POSITIVE
ANION GAP SERPL CALC-SCNC: 4 MMOL/L (ref 2–11)
AST SERPL-CCNC: 64 U/L (ref 15–37)
BARBITURATES UR QL SCN: NEGATIVE
BASOPHILS # BLD: 0 K/UL (ref 0–0.2)
BASOPHILS NFR BLD: 0 % (ref 0–2)
BENZODIAZ UR QL: NEGATIVE
BILIRUB SERPL-MCNC: 0.6 MG/DL (ref 0.2–1.1)
BILIRUB UR QL: ABNORMAL
BUN SERPL-MCNC: 18 MG/DL (ref 8–23)
CALCIUM SERPL-MCNC: 8.6 MG/DL (ref 8.3–10.4)
CANNABINOIDS UR QL SCN: POSITIVE
CHLORIDE SERPL-SCNC: 108 MMOL/L (ref 101–110)
CO2 SERPL-SCNC: 25 MMOL/L (ref 21–32)
COCAINE UR QL SCN: POSITIVE
CREAT SERPL-MCNC: 1 MG/DL (ref 0.8–1.5)
DIFFERENTIAL METHOD BLD: ABNORMAL
EOSINOPHIL # BLD: 0.3 K/UL (ref 0–0.8)
EOSINOPHIL NFR BLD: 3 % (ref 0.5–7.8)
ERYTHROCYTE [DISTWIDTH] IN BLOOD BY AUTOMATED COUNT: 13.2 % (ref 11.9–14.6)
ETHANOL SERPL-MCNC: <3 MG/DL (ref 0–0.08)
GLOBULIN SER CALC-MCNC: 2.6 G/DL (ref 2.8–4.5)
GLUCOSE SERPL-MCNC: 93 MG/DL (ref 65–100)
GLUCOSE UR QL STRIP.AUTO: NEGATIVE MG/DL
HCT VFR BLD AUTO: 36.8 % (ref 41.1–50.3)
HGB BLD-MCNC: 12.2 G/DL (ref 13.6–17.2)
IMM GRANULOCYTES # BLD AUTO: 0.1 K/UL (ref 0–0.5)
IMM GRANULOCYTES NFR BLD AUTO: 1 % (ref 0–5)
KETONES UR-MCNC: ABNORMAL MG/DL
LEUKOCYTE ESTERASE UR QL STRIP: NEGATIVE
LYMPHOCYTES # BLD: 1.7 K/UL (ref 0.5–4.6)
LYMPHOCYTES NFR BLD: 17 % (ref 13–44)
MCH RBC QN AUTO: 30.3 PG (ref 26.1–32.9)
MCHC RBC AUTO-ENTMCNC: 33.2 G/DL (ref 31.4–35)
MCV RBC AUTO: 91.3 FL (ref 82–102)
METHADONE UR QL: NEGATIVE
MONOCYTES # BLD: 0.8 K/UL (ref 0.1–1.3)
MONOCYTES NFR BLD: 8 % (ref 4–12)
NEUTS SEG # BLD: 7 K/UL (ref 1.7–8.2)
NEUTS SEG NFR BLD: 71 % (ref 43–78)
NITRITE UR QL: NEGATIVE
NRBC # BLD: 0 K/UL (ref 0–0.2)
OPIATES UR QL: NEGATIVE
PCP UR QL: NEGATIVE
PH UR: 6 (ref 5–9)
PLATELET # BLD AUTO: 216 K/UL (ref 150–450)
PMV BLD AUTO: 10.4 FL (ref 9.4–12.3)
POTASSIUM SERPL-SCNC: 3.8 MMOL/L (ref 3.5–5.1)
PROT SERPL-MCNC: 5.6 G/DL (ref 6.3–8.2)
PROT UR QL: NEGATIVE MG/DL
RBC # BLD AUTO: 4.03 M/UL (ref 4.23–5.6)
RBC # UR STRIP: ABNORMAL
SERVICE CMNT-IMP: ABNORMAL
SODIUM SERPL-SCNC: 137 MMOL/L (ref 133–143)
SP GR UR: >1.03 (ref 1–1.02)
UROBILINOGEN UR QL: 1 EU/DL (ref 0.2–1)
WBC # BLD AUTO: 9.8 K/UL (ref 4.3–11.1)

## 2023-06-06 PROCEDURE — 2580000003 HC RX 258: Performed by: NURSE PRACTITIONER

## 2023-06-06 PROCEDURE — G0378 HOSPITAL OBSERVATION PER HR: HCPCS

## 2023-06-06 PROCEDURE — 82077 ASSAY SPEC XCP UR&BREATH IA: CPT

## 2023-06-06 PROCEDURE — 96374 THER/PROPH/DIAG INJ IV PUSH: CPT

## 2023-06-06 PROCEDURE — 6360000002 HC RX W HCPCS: Performed by: HOSPITALIST

## 2023-06-06 PROCEDURE — 6360000004 HC RX CONTRAST MEDICATION: Performed by: NURSE PRACTITIONER

## 2023-06-06 PROCEDURE — 81003 URINALYSIS AUTO W/O SCOPE: CPT

## 2023-06-06 PROCEDURE — 80307 DRUG TEST PRSMV CHEM ANLYZR: CPT

## 2023-06-06 PROCEDURE — 74178 CT ABD&PLV WO CNTR FLWD CNTR: CPT

## 2023-06-06 PROCEDURE — 85025 COMPLETE CBC W/AUTO DIFF WBC: CPT

## 2023-06-06 PROCEDURE — 96375 TX/PRO/DX INJ NEW DRUG ADDON: CPT

## 2023-06-06 PROCEDURE — 51702 INSERT TEMP BLADDER CATH: CPT

## 2023-06-06 PROCEDURE — 99222 1ST HOSP IP/OBS MODERATE 55: CPT | Performed by: UROLOGY

## 2023-06-06 PROCEDURE — 2580000003 HC RX 258: Performed by: HOSPITALIST

## 2023-06-06 PROCEDURE — 6370000000 HC RX 637 (ALT 250 FOR IP): Performed by: HOSPITALIST

## 2023-06-06 PROCEDURE — 80053 COMPREHEN METABOLIC PANEL: CPT

## 2023-06-06 PROCEDURE — 2500000003 HC RX 250 WO HCPCS: Performed by: HOSPITALIST

## 2023-06-06 RX ORDER — 0.9 % SODIUM CHLORIDE 0.9 %
100 INTRAVENOUS SOLUTION INTRAVENOUS
Status: COMPLETED | OUTPATIENT
Start: 2023-06-06 | End: 2023-06-06

## 2023-06-06 RX ORDER — TAMSULOSIN HYDROCHLORIDE 0.4 MG/1
0.4 CAPSULE ORAL DAILY
Status: DISCONTINUED | OUTPATIENT
Start: 2023-06-06 | End: 2023-06-10 | Stop reason: HOSPADM

## 2023-06-06 RX ORDER — SODIUM CHLORIDE 0.9 % (FLUSH) 0.9 %
5-40 SYRINGE (ML) INJECTION EVERY 12 HOURS SCHEDULED
Status: DISCONTINUED | OUTPATIENT
Start: 2023-06-06 | End: 2023-06-10 | Stop reason: HOSPADM

## 2023-06-06 RX ORDER — LORAZEPAM 2 MG/ML
3 INJECTION INTRAMUSCULAR
Status: DISCONTINUED | OUTPATIENT
Start: 2023-06-06 | End: 2023-06-10 | Stop reason: HOSPADM

## 2023-06-06 RX ORDER — LORAZEPAM 2 MG/ML
1 INJECTION INTRAMUSCULAR
Status: DISCONTINUED | OUTPATIENT
Start: 2023-06-06 | End: 2023-06-10 | Stop reason: HOSPADM

## 2023-06-06 RX ORDER — LORAZEPAM 1 MG/1
4 TABLET ORAL
Status: DISCONTINUED | OUTPATIENT
Start: 2023-06-06 | End: 2023-06-10

## 2023-06-06 RX ORDER — SODIUM CHLORIDE 9 MG/ML
INJECTION, SOLUTION INTRAVENOUS PRN
Status: DISCONTINUED | OUTPATIENT
Start: 2023-06-06 | End: 2023-06-10 | Stop reason: HOSPADM

## 2023-06-06 RX ORDER — SODIUM CHLORIDE 0.9 % (FLUSH) 0.9 %
5-40 SYRINGE (ML) INJECTION PRN
Status: DISCONTINUED | OUTPATIENT
Start: 2023-06-06 | End: 2023-06-10

## 2023-06-06 RX ORDER — ONDANSETRON 2 MG/ML
4 INJECTION INTRAMUSCULAR; INTRAVENOUS EVERY 6 HOURS PRN
Status: DISCONTINUED | OUTPATIENT
Start: 2023-06-06 | End: 2023-06-10 | Stop reason: HOSPADM

## 2023-06-06 RX ORDER — POLYETHYLENE GLYCOL 3350 17 G/17G
17 POWDER, FOR SOLUTION ORAL DAILY PRN
Status: DISCONTINUED | OUTPATIENT
Start: 2023-06-06 | End: 2023-06-10 | Stop reason: HOSPADM

## 2023-06-06 RX ORDER — LANOLIN ALCOHOL/MO/W.PET/CERES
100 CREAM (GRAM) TOPICAL DAILY
Status: DISCONTINUED | OUTPATIENT
Start: 2023-06-06 | End: 2023-06-10 | Stop reason: HOSPADM

## 2023-06-06 RX ORDER — ONDANSETRON 4 MG/1
4 TABLET, ORALLY DISINTEGRATING ORAL EVERY 8 HOURS PRN
Status: DISCONTINUED | OUTPATIENT
Start: 2023-06-06 | End: 2023-06-10 | Stop reason: HOSPADM

## 2023-06-06 RX ORDER — SODIUM CHLORIDE 9 MG/ML
INJECTION, SOLUTION INTRAVENOUS PRN
Status: DISCONTINUED | OUTPATIENT
Start: 2023-06-06 | End: 2023-06-10

## 2023-06-06 RX ORDER — SODIUM CHLORIDE 0.9 % (FLUSH) 0.9 %
5-40 SYRINGE (ML) INJECTION PRN
Status: DISCONTINUED | OUTPATIENT
Start: 2023-06-06 | End: 2023-06-10 | Stop reason: HOSPADM

## 2023-06-06 RX ORDER — LORAZEPAM 1 MG/1
1 TABLET ORAL
Status: DISCONTINUED | OUTPATIENT
Start: 2023-06-06 | End: 2023-06-10 | Stop reason: HOSPADM

## 2023-06-06 RX ORDER — ACETAMINOPHEN 325 MG/1
650 TABLET ORAL EVERY 6 HOURS PRN
Status: DISCONTINUED | OUTPATIENT
Start: 2023-06-06 | End: 2023-06-10 | Stop reason: HOSPADM

## 2023-06-06 RX ORDER — LORAZEPAM 2 MG/ML
4 INJECTION INTRAMUSCULAR
Status: DISCONTINUED | OUTPATIENT
Start: 2023-06-06 | End: 2023-06-10

## 2023-06-06 RX ORDER — SODIUM CHLORIDE 0.9 % (FLUSH) 0.9 %
10 SYRINGE (ML) INJECTION
Status: COMPLETED | OUTPATIENT
Start: 2023-06-06 | End: 2023-06-06

## 2023-06-06 RX ORDER — LORAZEPAM 1 MG/1
3 TABLET ORAL
Status: DISCONTINUED | OUTPATIENT
Start: 2023-06-06 | End: 2023-06-10 | Stop reason: HOSPADM

## 2023-06-06 RX ORDER — LORAZEPAM 2 MG/ML
2 INJECTION INTRAMUSCULAR
Status: DISCONTINUED | OUTPATIENT
Start: 2023-06-06 | End: 2023-06-10 | Stop reason: HOSPADM

## 2023-06-06 RX ORDER — HYDRALAZINE HYDROCHLORIDE 20 MG/ML
10 INJECTION INTRAMUSCULAR; INTRAVENOUS EVERY 4 HOURS PRN
Status: DISCONTINUED | OUTPATIENT
Start: 2023-06-06 | End: 2023-06-10

## 2023-06-06 RX ORDER — SODIUM CHLORIDE 0.9 % (FLUSH) 0.9 %
5-40 SYRINGE (ML) INJECTION EVERY 12 HOURS SCHEDULED
Status: DISCONTINUED | OUTPATIENT
Start: 2023-06-06 | End: 2023-06-10

## 2023-06-06 RX ORDER — LORAZEPAM 1 MG/1
2 TABLET ORAL
Status: DISCONTINUED | OUTPATIENT
Start: 2023-06-06 | End: 2023-06-10 | Stop reason: HOSPADM

## 2023-06-06 RX ORDER — LABETALOL HYDROCHLORIDE 5 MG/ML
10 INJECTION, SOLUTION INTRAVENOUS EVERY 6 HOURS PRN
Status: DISCONTINUED | OUTPATIENT
Start: 2023-06-06 | End: 2023-06-10 | Stop reason: HOSPADM

## 2023-06-06 RX ORDER — ACETAMINOPHEN 650 MG/1
650 SUPPOSITORY RECTAL EVERY 6 HOURS PRN
Status: DISCONTINUED | OUTPATIENT
Start: 2023-06-06 | End: 2023-06-10 | Stop reason: HOSPADM

## 2023-06-06 RX ADMIN — SODIUM CHLORIDE, PRESERVATIVE FREE 10 ML: 5 INJECTION INTRAVENOUS at 09:08

## 2023-06-06 RX ADMIN — IOPAMIDOL 100 ML: 755 INJECTION, SOLUTION INTRAVENOUS at 09:06

## 2023-06-06 RX ADMIN — SODIUM CHLORIDE 100 ML: 9 INJECTION, SOLUTION INTRAVENOUS at 09:08

## 2023-06-06 RX ADMIN — Medication 100 MG: at 07:46

## 2023-06-06 RX ADMIN — SODIUM CHLORIDE, PRESERVATIVE FREE 10 ML: 5 INJECTION INTRAVENOUS at 20:17

## 2023-06-06 RX ADMIN — HYDRALAZINE HYDROCHLORIDE 10 MG: 20 INJECTION INTRAMUSCULAR; INTRAVENOUS at 05:42

## 2023-06-06 RX ADMIN — LORAZEPAM 2 MG: 2 INJECTION INTRAMUSCULAR; INTRAVENOUS at 07:46

## 2023-06-06 RX ADMIN — LABETALOL HYDROCHLORIDE 10 MG: 5 INJECTION INTRAVENOUS at 04:37

## 2023-06-06 RX ADMIN — SODIUM CHLORIDE, PRESERVATIVE FREE 10 ML: 5 INJECTION INTRAVENOUS at 08:49

## 2023-06-06 RX ADMIN — ACETAMINOPHEN 650 MG: 325 TABLET ORAL at 20:17

## 2023-06-06 RX ADMIN — LORAZEPAM 1 MG: 2 INJECTION INTRAMUSCULAR; INTRAVENOUS at 20:16

## 2023-06-06 ASSESSMENT — PAIN - FUNCTIONAL ASSESSMENT
PAIN_FUNCTIONAL_ASSESSMENT: ACTIVITIES ARE NOT PREVENTED
PAIN_FUNCTIONAL_ASSESSMENT: NONE - DENIES PAIN

## 2023-06-06 ASSESSMENT — PAIN SCALES - GENERAL
PAINLEVEL_OUTOF10: 3
PAINLEVEL_OUTOF10: 0
PAINLEVEL_OUTOF10: 0

## 2023-06-06 ASSESSMENT — LIFESTYLE VARIABLES
HOW MANY STANDARD DRINKS CONTAINING ALCOHOL DO YOU HAVE ON A TYPICAL DAY: PATIENT DOES NOT DRINK
HOW OFTEN DO YOU HAVE A DRINK CONTAINING ALCOHOL: NEVER

## 2023-06-06 ASSESSMENT — PAIN DESCRIPTION - LOCATION: LOCATION: LEG

## 2023-06-06 ASSESSMENT — PAIN DESCRIPTION - ORIENTATION: ORIENTATION: RIGHT;LEFT

## 2023-06-06 ASSESSMENT — PAIN DESCRIPTION - PAIN TYPE: TYPE: ACUTE PAIN

## 2023-06-06 ASSESSMENT — PAIN DESCRIPTION - FREQUENCY: FREQUENCY: INTERMITTENT

## 2023-06-06 ASSESSMENT — PAIN DESCRIPTION - ONSET: ONSET: GRADUAL

## 2023-06-06 NOTE — ED TRIAGE NOTES
Pt. Arrives to the ER today with complaints of urinary infrequency and retention. Pt. Has hx of prostate cancer.  Pt appears to be confused with altered LOC

## 2023-06-06 NOTE — CONSULTS
Urology Consult    Requesting MD: hospitalist    Patient: Page Rand MRN: [id number]  SSN: [id number]    YOB: 1950  Age: 72 y.o. Sex: male      Subjective:      Page Rand is a 72 y.o. male who  with a history of alcohol abuse, methamphetamine abuse, currently homeless, history of hypertension noncompliant with antihypertensives presented to emergency room with suprapubic abdominal pain, difficulty with urination. Patient reports history of bladder cancer but currently not following with anyone right this m time. In emergency room patient not able to pass urine despite staying half an hour in the bathroom, Franco cath was inserted, patient able to pass 1.2 L of urine. Patient also complaining of abdominal discomfort, muscle aches, not feeling very well. Oriented x1-2, looks slightly confused. Urology consult for urinary retention. Patient drowsy. Unable to get history from patient. Nursing reports recent ativan for agitation. Past Medical History:   Diagnosis Date    Prostate cancer ([facility] [address]      No past surgical history on file. No family history on file. Social History     Tobacco Use    Smoking status: Every Day     Packs/day: 0.50     Types: Cigarettes    Smokeless tobacco: Never   Substance Use Topics    Alcohol use: Yes     Alcohol/week: 1.0 standard drink     Types: 1 Cans of beer per week      Prior to Admission medications    Medication Sig Start Date End Date Taking? Authorizing Provider   clindamycin (CLEOCIN) 300 MG capsule TAKE 1 CAPSULE BY MOUTH EVERY 6 HOURS 3/30/23   Historical Provider, MD   clindamycin (CLEOCIN) 300 MG capsule clindamycin HCl 300 mg capsule   TAKE 1 CAPSULE BY MOUTH EVERY 6 HOURS    Historical Provider, MD   diclofenac sodium (VOLTAREN) 1 % GEL  4/29/23   Historical Provider, MD   gabapentin (NEURONTIN) 100 MG capsule Take 1 capsule by mouth 2 times daily for 12 days.  Intended supply: 90 days 5/2/23 5/14/23  Ania Carvalho MD

## 2023-06-06 NOTE — ED PROVIDER NOTES
Emergency Department Provider Note       PCP: On File Not (Inactive)   Age: 72 y.o. Sex: male     DISPOSITION Decision To Admit 06/06/2023 01:33:07 AM       ICD-10-CM    1. Urinary retention  R33.9       2. Homelessness  Z59.00           Medical Decision Making     Complexity of Problems Addressed:  1 or more acute illnesses that pose a threat to life or bodily function. Data Reviewed and Analyzed:  Category 1:   I independently ordered and reviewed each unique test.  I reviewed external records: ED visit note from an outside group. I reviewed external records: previous lab results from outside ED. Category 2:       Category 3: Discussion of management or test interpretation. Urinary retention, alcohol intoxication, amphetamine abuse, homelessness, electrolyte abnormality, renal failure,  The patient was admitted and I have discussed patient management with the admitting provider. The management of this patient was discussed with an external consultant. Risk of Complications and/or Morbidity of Patient Management:      ED Course as of 06/06/23 0134   Mon Jun 05, 2023   2342 Patient was in the bathroom for the first 45 minutes of his visit to the ER. [KH]   Tue Jun 06, 2023   0106 The patient had 800 cc of urine out after the Franco catheter was placed. He is sleeping and resting comfortably at this time. Plan will be to discharge him and have outpatient follow-up with urology. [KH]   0131 I spoke with the nurse practitioner on for urology regarding the patient's situation social and medical.  They noted that if internal medicine would admit the patient they will evaluate and treat for urinary retention tomorrow. Dr. Fabiola Devi the hospitalist on-call was consulted and will admit the patient.  [KH]      ED Course User Index  [KH] Elaine Figueroa DO       History      Court Gonzalez is a 72 y.o. male who presents to the Emergency Department with chief complaint of    Chief Complaint   Patient

## 2023-06-06 NOTE — ED NOTES
TRANSFER - OUT REPORT:    Verbal report given to [facility] RN on Nikkie Perez  being transferred to room 306 for routine progression of patient care       Report consisted of patient's Situation, Background, Assessment and   Recommendations(SBAR). Information from the following report(s) ED SBAR was reviewed with the receiving nurse. Lines:   Peripheral IV 06/06/23 Left Antecubital (Active)   Site Assessment Clean, dry & intact 06/06/23 0545   Line Status Blood return noted; Flushed;Normal saline locked 06/06/23 0545   Phlebitis Assessment No symptoms 06/06/23 0545   Infiltration Assessment 0 06/06/23 0545        Opportunity for questions and clarification was provided.       Patient transported with:  Jorge Oconnor RN  06/06/23 [id number]

## 2023-06-06 NOTE — H&P
Hospitalist History and Physical   Admit Date:  2023 11:44 PM   Name:  Eliud Faust   Age:  72 y.o. Sex:  male  :  1950   MRN:  [id number]   Room:  [facility]/    Presenting/Chief Complaint: Urinary Retention     Reason(s) for Admission: Urinary retention [R33.9]     History of Present Illness:     72years old gentleman with a history of alcohol abuse, methamphetamine abuse, currently homeless, history of hypertension noncompliant with antihypertensives presented to emergency room with suprapubic abdominal pain, difficulty with urination. Patient reports history of bladder cancer but currently not following with anyone right this m time. In emergency room patient not able to pass urine despite staying half an hour in the bathroom, Franco cath was inserted, patient able to pass 1.2 L of urine. Patient also complaining of abdominal discomfort, muscle aches, not feeling very well. Oriented x1-2, looks slightly confused. Urology was contacted who recommended hospitalist admission. Initially labs were not obtained which was ordered. Assessment & Plan:     Urinary retention: I will obtain ultrasound abdomen, will request urology consultation. Alcohol abuse and amphetamine abuse: Patient reports alcohol use and drug use but not able to quantify at this time, looks slightly confused, will closely monitor for alcohol withdrawal syndrome. Hypertension: Patient looks like not taking any antihypertensives but medication profile states he was on lisinopril. CBC available, BMP not available at this time, labs are still pending. PT/OT evals and PPD needed/ordered? No  Diet: ADULT DIET; Regular  VTE prophylaxis: SCD's   Code status: Full Code    Hospital Problems:  Principal Problem:    Urinary retention  Active Problems:    History of alcohol abuse    Methamphetamine abuse ([facility] [address]  Resolved Problems:    * No resolved hospital problems.  *       Past History:     Past

## 2023-06-06 NOTE — ED NOTES
Checked on pt in restroom x 3, pt will not exit the restroom for triage.       Jude Irving RN  06/05/23 2366

## 2023-06-07 ENCOUNTER — APPOINTMENT (OUTPATIENT)
Dept: GENERAL RADIOLOGY | Age: 73
End: 2023-06-07
Payer: MEDICARE

## 2023-06-07 ENCOUNTER — TELEPHONE (OUTPATIENT)
Dept: UROLOGY | Age: 73
End: 2023-06-07

## 2023-06-07 PROBLEM — N32.0 BLADDER NECK OBSTRUCTION: Status: ACTIVE | Noted: 2023-06-07

## 2023-06-07 PROBLEM — N32.3 BLADDER DIVERTICULUM: Status: ACTIVE | Noted: 2023-06-07

## 2023-06-07 PROBLEM — N32.0 BLADDER OUTLET OBSTRUCTION: Status: ACTIVE | Noted: 2023-06-07

## 2023-06-07 LAB
ALBUMIN SERPL-MCNC: 2.5 G/DL (ref 3.2–4.6)
ALBUMIN/GLOB SERPL: 0.9 (ref 0.4–1.6)
ALP SERPL-CCNC: 59 U/L (ref 50–136)
ALT SERPL-CCNC: 37 U/L (ref 12–65)
ANION GAP SERPL CALC-SCNC: 5 MMOL/L (ref 2–11)
AST SERPL-CCNC: 39 U/L (ref 15–37)
BASOPHILS # BLD: 0 K/UL (ref 0–0.2)
BASOPHILS NFR BLD: 0 % (ref 0–2)
BILIRUB SERPL-MCNC: 0.6 MG/DL (ref 0.2–1.1)
BUN SERPL-MCNC: 14 MG/DL (ref 8–23)
CALCIUM SERPL-MCNC: 8.2 MG/DL (ref 8.3–10.4)
CHLORIDE SERPL-SCNC: 107 MMOL/L (ref 101–110)
CO2 SERPL-SCNC: 24 MMOL/L (ref 21–32)
CREAT SERPL-MCNC: 0.9 MG/DL (ref 0.8–1.5)
DIFFERENTIAL METHOD BLD: ABNORMAL
EOSINOPHIL # BLD: 0.2 K/UL (ref 0–0.8)
EOSINOPHIL NFR BLD: 2 % (ref 0.5–7.8)
ERYTHROCYTE [DISTWIDTH] IN BLOOD BY AUTOMATED COUNT: 13.3 % (ref 11.9–14.6)
GLOBULIN SER CALC-MCNC: 2.9 G/DL (ref 2.8–4.5)
GLUCOSE SERPL-MCNC: 80 MG/DL (ref 65–100)
HCT VFR BLD AUTO: 39.7 % (ref 41.1–50.3)
HGB BLD-MCNC: 13.4 G/DL (ref 13.6–17.2)
IMM GRANULOCYTES # BLD AUTO: 0.1 K/UL (ref 0–0.5)
IMM GRANULOCYTES NFR BLD AUTO: 1 % (ref 0–5)
LYMPHOCYTES # BLD: 1.6 K/UL (ref 0.5–4.6)
LYMPHOCYTES NFR BLD: 15 % (ref 13–44)
MAGNESIUM SERPL-MCNC: 1.9 MG/DL (ref 1.8–2.4)
MCH RBC QN AUTO: 30.6 PG (ref 26.1–32.9)
MCHC RBC AUTO-ENTMCNC: 33.8 G/DL (ref 31.4–35)
MCV RBC AUTO: 90.6 FL (ref 82–102)
MONOCYTES # BLD: 0.7 K/UL (ref 0.1–1.3)
MONOCYTES NFR BLD: 6 % (ref 4–12)
NEUTS SEG # BLD: 8 K/UL (ref 1.7–8.2)
NEUTS SEG NFR BLD: 76 % (ref 43–78)
NRBC # BLD: 0 K/UL (ref 0–0.2)
PLATELET # BLD AUTO: 224 K/UL (ref 150–450)
PMV BLD AUTO: 9.9 FL (ref 9.4–12.3)
POTASSIUM SERPL-SCNC: 3.5 MMOL/L (ref 3.5–5.1)
PROT SERPL-MCNC: 5.4 G/DL (ref 6.3–8.2)
RBC # BLD AUTO: 4.38 M/UL (ref 4.23–5.6)
SODIUM SERPL-SCNC: 136 MMOL/L (ref 133–143)
WBC # BLD AUTO: 10.6 K/UL (ref 4.3–11.1)

## 2023-06-07 PROCEDURE — 6370000000 HC RX 637 (ALT 250 FOR IP): Performed by: NURSE PRACTITIONER

## 2023-06-07 PROCEDURE — 2580000003 HC RX 258: Performed by: HOSPITALIST

## 2023-06-07 PROCEDURE — 83735 ASSAY OF MAGNESIUM: CPT

## 2023-06-07 PROCEDURE — 71046 X-RAY EXAM CHEST 2 VIEWS: CPT

## 2023-06-07 PROCEDURE — 80053 COMPREHEN METABOLIC PANEL: CPT

## 2023-06-07 PROCEDURE — 6360000002 HC RX W HCPCS: Performed by: HOSPITALIST

## 2023-06-07 PROCEDURE — 36415 COLL VENOUS BLD VENIPUNCTURE: CPT

## 2023-06-07 PROCEDURE — 51702 INSERT TEMP BLADDER CATH: CPT

## 2023-06-07 PROCEDURE — 85025 COMPLETE CBC W/AUTO DIFF WBC: CPT

## 2023-06-07 PROCEDURE — 99232 SBSQ HOSP IP/OBS MODERATE 35: CPT | Performed by: NURSE PRACTITIONER

## 2023-06-07 PROCEDURE — 1100000000 HC RM PRIVATE

## 2023-06-07 PROCEDURE — 6370000000 HC RX 637 (ALT 250 FOR IP): Performed by: HOSPITALIST

## 2023-06-07 RX ORDER — FINASTERIDE 5 MG/1
5 TABLET, FILM COATED ORAL DAILY
Status: DISCONTINUED | OUTPATIENT
Start: 2023-06-07 | End: 2023-06-10 | Stop reason: HOSPADM

## 2023-06-07 RX ADMIN — Medication 100 MG: at 09:21

## 2023-06-07 RX ADMIN — SODIUM CHLORIDE, PRESERVATIVE FREE 10 ML: 5 INJECTION INTRAVENOUS at 21:39

## 2023-06-07 RX ADMIN — FINASTERIDE 5 MG: 5 TABLET, FILM COATED ORAL at 09:26

## 2023-06-07 RX ADMIN — SODIUM CHLORIDE, PRESERVATIVE FREE 10 ML: 5 INJECTION INTRAVENOUS at 09:22

## 2023-06-07 RX ADMIN — LORAZEPAM 2 MG: 2 INJECTION INTRAMUSCULAR; INTRAVENOUS at 13:27

## 2023-06-07 RX ADMIN — TAMSULOSIN HYDROCHLORIDE 0.4 MG: 0.4 CAPSULE ORAL at 09:22

## 2023-06-07 ASSESSMENT — PAIN SCALES - GENERAL
PAINLEVEL_OUTOF10: 0

## 2023-06-07 NOTE — TELEPHONE ENCOUNTER
Procedures: Procedure(s):   CYSTOSCOPY TRANSURETHRAL RESECTION PROSTATE/ rm 306   Date: 6/10/2023   Time: 0800   Location: [facility] MAIN OR 01 CYSTO

## 2023-06-07 NOTE — TELEPHONE ENCOUNTER
----- Message from JENI Garcia sent at 6/7/2023  9:21 AM EDT -----  Can you schedule this patient for a TURP with Dr. Gunner Capone on Saturday 6/10.

## 2023-06-08 PROCEDURE — 6370000000 HC RX 637 (ALT 250 FOR IP): Performed by: HOSPITALIST

## 2023-06-08 PROCEDURE — 6360000002 HC RX W HCPCS: Performed by: HOSPITALIST

## 2023-06-08 PROCEDURE — 2580000003 HC RX 258: Performed by: HOSPITALIST

## 2023-06-08 PROCEDURE — 99232 SBSQ HOSP IP/OBS MODERATE 35: CPT | Performed by: UROLOGY

## 2023-06-08 PROCEDURE — 1100000000 HC RM PRIVATE

## 2023-06-08 PROCEDURE — 51702 INSERT TEMP BLADDER CATH: CPT

## 2023-06-08 PROCEDURE — 6370000000 HC RX 637 (ALT 250 FOR IP): Performed by: NURSE PRACTITIONER

## 2023-06-08 RX ORDER — TRAMADOL HYDROCHLORIDE 50 MG/1
50 TABLET ORAL ONCE
Status: COMPLETED | OUTPATIENT
Start: 2023-06-08 | End: 2023-06-08

## 2023-06-08 RX ADMIN — LORAZEPAM 1 MG: 2 INJECTION INTRAMUSCULAR; INTRAVENOUS at 03:17

## 2023-06-08 RX ADMIN — Medication 100 MG: at 08:47

## 2023-06-08 RX ADMIN — SODIUM CHLORIDE, PRESERVATIVE FREE 20 ML: 5 INJECTION INTRAVENOUS at 08:50

## 2023-06-08 RX ADMIN — TRAMADOL HYDROCHLORIDE 50 MG: 50 TABLET ORAL at 01:51

## 2023-06-08 RX ADMIN — SODIUM CHLORIDE, PRESERVATIVE FREE 10 ML: 5 INJECTION INTRAVENOUS at 20:51

## 2023-06-08 RX ADMIN — FINASTERIDE 5 MG: 5 TABLET, FILM COATED ORAL at 08:46

## 2023-06-08 RX ADMIN — TAMSULOSIN HYDROCHLORIDE 0.4 MG: 0.4 CAPSULE ORAL at 08:46

## 2023-06-08 RX ADMIN — SODIUM CHLORIDE, PRESERVATIVE FREE 10 ML: 5 INJECTION INTRAVENOUS at 08:52

## 2023-06-08 ASSESSMENT — PAIN DESCRIPTION - DESCRIPTORS: DESCRIPTORS: ACHING;DISCOMFORT

## 2023-06-08 ASSESSMENT — PAIN DESCRIPTION - LOCATION: LOCATION: SHOULDER;ABDOMEN

## 2023-06-08 ASSESSMENT — PAIN - FUNCTIONAL ASSESSMENT: PAIN_FUNCTIONAL_ASSESSMENT: ACTIVITIES ARE NOT PREVENTED

## 2023-06-08 ASSESSMENT — PAIN SCALES - GENERAL
PAINLEVEL_OUTOF10: 0
PAINLEVEL_OUTOF10: 6

## 2023-06-08 ASSESSMENT — PAIN DESCRIPTION - ORIENTATION: ORIENTATION: RIGHT;LOWER

## 2023-06-09 ENCOUNTER — ANESTHESIA EVENT (OUTPATIENT)
Dept: SURGERY | Age: 73
End: 2023-06-09
Payer: MEDICARE

## 2023-06-09 LAB
ANION GAP SERPL CALC-SCNC: 5 MMOL/L (ref 2–11)
BUN SERPL-MCNC: 13 MG/DL (ref 8–23)
CALCIUM SERPL-MCNC: 8.6 MG/DL (ref 8.3–10.4)
CHLORIDE SERPL-SCNC: 109 MMOL/L (ref 101–110)
CO2 SERPL-SCNC: 26 MMOL/L (ref 21–32)
CREAT SERPL-MCNC: 0.9 MG/DL (ref 0.8–1.5)
ERYTHROCYTE [DISTWIDTH] IN BLOOD BY AUTOMATED COUNT: 13.4 % (ref 11.9–14.6)
GLUCOSE SERPL-MCNC: 107 MG/DL (ref 65–100)
HCT VFR BLD AUTO: 42.9 % (ref 41.1–50.3)
HGB BLD-MCNC: 14 G/DL (ref 13.6–17.2)
MCH RBC QN AUTO: 30.1 PG (ref 26.1–32.9)
MCHC RBC AUTO-ENTMCNC: 32.6 G/DL (ref 31.4–35)
MCV RBC AUTO: 92.3 FL (ref 82–102)
NRBC # BLD: 0 K/UL (ref 0–0.2)
PLATELET # BLD AUTO: 233 K/UL (ref 150–450)
PMV BLD AUTO: 10.2 FL (ref 9.4–12.3)
POTASSIUM SERPL-SCNC: 4 MMOL/L (ref 3.5–5.1)
RBC # BLD AUTO: 4.65 M/UL (ref 4.23–5.6)
SODIUM SERPL-SCNC: 140 MMOL/L (ref 133–143)
WBC # BLD AUTO: 9.7 K/UL (ref 4.3–11.1)

## 2023-06-09 PROCEDURE — 2580000003 HC RX 258: Performed by: HOSPITALIST

## 2023-06-09 PROCEDURE — 85027 COMPLETE CBC AUTOMATED: CPT

## 2023-06-09 PROCEDURE — 51702 INSERT TEMP BLADDER CATH: CPT

## 2023-06-09 PROCEDURE — 36415 COLL VENOUS BLD VENIPUNCTURE: CPT

## 2023-06-09 PROCEDURE — 6370000000 HC RX 637 (ALT 250 FOR IP): Performed by: NURSE PRACTITIONER

## 2023-06-09 PROCEDURE — 6370000000 HC RX 637 (ALT 250 FOR IP): Performed by: PHYSICIAN ASSISTANT

## 2023-06-09 PROCEDURE — 80048 BASIC METABOLIC PNL TOTAL CA: CPT

## 2023-06-09 PROCEDURE — 6370000000 HC RX 637 (ALT 250 FOR IP): Performed by: HOSPITALIST

## 2023-06-09 PROCEDURE — 1100000000 HC RM PRIVATE

## 2023-06-09 PROCEDURE — 99233 SBSQ HOSP IP/OBS HIGH 50: CPT | Performed by: UROLOGY

## 2023-06-09 RX ORDER — SENNA AND DOCUSATE SODIUM 50; 8.6 MG/1; MG/1
2 TABLET, FILM COATED ORAL DAILY
Status: DISCONTINUED | OUTPATIENT
Start: 2023-06-09 | End: 2023-06-10 | Stop reason: HOSPADM

## 2023-06-09 RX ORDER — SODIUM CHLORIDE, SODIUM LACTATE, POTASSIUM CHLORIDE, CALCIUM CHLORIDE 600; 310; 30; 20 MG/100ML; MG/100ML; MG/100ML; MG/100ML
INJECTION, SOLUTION INTRAVENOUS CONTINUOUS
Status: CANCELLED | OUTPATIENT
Start: 2023-06-09

## 2023-06-09 RX ORDER — POLYETHYLENE GLYCOL 3350 17 G/17G
17 POWDER, FOR SOLUTION ORAL ONCE
Status: COMPLETED | OUTPATIENT
Start: 2023-06-09 | End: 2023-06-09

## 2023-06-09 RX ORDER — SODIUM CHLORIDE 0.9 % (FLUSH) 0.9 %
5-40 SYRINGE (ML) INJECTION EVERY 12 HOURS SCHEDULED
Status: CANCELLED | OUTPATIENT
Start: 2023-06-09

## 2023-06-09 RX ORDER — MIDAZOLAM HYDROCHLORIDE 2 MG/2ML
2 INJECTION, SOLUTION INTRAMUSCULAR; INTRAVENOUS
Status: CANCELLED | OUTPATIENT
Start: 2023-06-09 | End: 2023-06-10

## 2023-06-09 RX ORDER — FENTANYL CITRATE 50 UG/ML
100 INJECTION, SOLUTION INTRAMUSCULAR; INTRAVENOUS
Status: CANCELLED | OUTPATIENT
Start: 2023-06-09 | End: 2023-06-10

## 2023-06-09 RX ORDER — SCOLOPAMINE TRANSDERMAL SYSTEM 1 MG/1
1 PATCH, EXTENDED RELEASE TRANSDERMAL
Status: CANCELLED | OUTPATIENT
Start: 2023-06-09 | End: 2023-06-12

## 2023-06-09 RX ADMIN — Medication 100 MG: at 09:40

## 2023-06-09 RX ADMIN — TAMSULOSIN HYDROCHLORIDE 0.4 MG: 0.4 CAPSULE ORAL at 09:40

## 2023-06-09 RX ADMIN — SENNOSIDES AND DOCUSATE SODIUM 2 TABLET: 8.6; 5 TABLET ORAL at 10:46

## 2023-06-09 RX ADMIN — MAGNESIUM HYDROXIDE 15 ML: 400 SUSPENSION ORAL at 10:46

## 2023-06-09 RX ADMIN — FINASTERIDE 5 MG: 5 TABLET, FILM COATED ORAL at 09:40

## 2023-06-09 RX ADMIN — ACETAMINOPHEN 650 MG: 325 TABLET ORAL at 10:40

## 2023-06-09 RX ADMIN — SODIUM CHLORIDE, PRESERVATIVE FREE 10 ML: 5 INJECTION INTRAVENOUS at 09:41

## 2023-06-09 RX ADMIN — SODIUM CHLORIDE, PRESERVATIVE FREE 10 ML: 5 INJECTION INTRAVENOUS at 20:37

## 2023-06-09 RX ADMIN — POLYETHYLENE GLYCOL 3350 17 G: 17 POWDER, FOR SOLUTION ORAL at 10:40

## 2023-06-09 ASSESSMENT — LIFESTYLE VARIABLES: SMOKING_STATUS: 1

## 2023-06-09 ASSESSMENT — PAIN DESCRIPTION - ORIENTATION: ORIENTATION: LOWER;MID

## 2023-06-09 ASSESSMENT — PAIN DESCRIPTION - LOCATION: LOCATION: ABDOMEN

## 2023-06-09 ASSESSMENT — PAIN DESCRIPTION - DESCRIPTORS: DESCRIPTORS: DISCOMFORT

## 2023-06-09 ASSESSMENT — PAIN SCALES - GENERAL
PAINLEVEL_OUTOF10: 0
PAINLEVEL_OUTOF10: 3

## 2023-06-09 NOTE — ANESTHESIA PRE PROCEDURE
PRN Adria Wilde MD        Or    LORazepam (ATIVAN) injection 3 mg  3 mg IntraVENous Q1H PRN Isaak Kearney MD        Or    LORazepam (ATIVAN) tablet 4 mg  4 mg Oral Q1H PRN Adria Wilde MD        Or    LORazepam (ATIVAN) injection 4 mg  4 mg IntraVENous Q1H PRN Adria Wilde MD        tamsulosin (FLOMAX) capsule 0.4 mg  0.4 mg Oral Daily Karo José NP-C   0.4 mg at 06/09/23 0940       Allergies: Allergies   Allergen Reactions    Penicillins Itching       Problem List:    Patient Active Problem List   Diagnosis Code    Arthritis of knee M17.10    Benign prostatic hyperplasia N40.0    Chest pain, rule out acute myocardial infarction R07.9    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 S72.422K, S03.505G    Closed fracture of distal epiphysis of left femur with nonunion S72.442K    Essential hypertension I10    Left knee pain M25.562    Neuropathy G62.9    Overweight with body mass index (BMI) 25.0-29.9 E66.3    Painful orthopaedic hardware ([facility] [address] T84.84XA    Post-operative infection T81.40XA    Post-traumatic osteoarthritis of left knee M17.32    Pulmonary nodule R91.1    Restless leg syndrome G25.81    Status post total right knee replacement Z96.651    Homeless Z59.00    Low back pain M54.50    Mixed hyperlipidemia E78.2    Urinary retention R33.9    History of alcohol abuse F10.11    Methamphetamine abuse (HCC) F15.10    Polysubstance abuse ([facility] [address] F19.10    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 Social History:    Social History     Tobacco Use    Smoking status: Every Day     Packs/day: 0.50     Types: Cigarettes    Smokeless tobacco: Never   Substance Use Topics    Alcohol use:  Yes     Alcohol/week: 1.0 standard drink     Types: 1 Cans of beer

## 2023-06-09 NOTE — PERIOP NOTE
6th floor staff informed of patient's procedure on 6/10 with Dr. Nimisha Warren. Pre-op arrival of 0630 with procedure time of 0800.

## 2023-06-09 NOTE — PLAN OF CARE
Problem: Discharge Planning  Goal: Discharge to home or other facility with appropriate resources  Outcome: Progressing  Flowsheets (Taken 6/8/2023 1950)  Discharge to home or other facility with appropriate resources: Identify barriers to discharge with patient and caregiver     Problem: Safety - Adult  Goal: Free from fall injury  Outcome: Progressing     Problem: ABCDS Injury Assessment  Goal: Absence of physical injury  Outcome: Progressing     Problem: Pain  Goal: Verbalizes/displays adequate comfort level or baseline comfort level  Outcome: Progressing     Problem: Genitourinary - Adult  Goal: Urinary catheter remains patent  Outcome: Progressing

## 2023-06-10 ENCOUNTER — ANESTHESIA (OUTPATIENT)
Dept: SURGERY | Age: 73
End: 2023-06-10
Payer: MEDICARE

## 2023-06-10 VITALS
RESPIRATION RATE: 18 BRPM | BODY MASS INDEX: 25.87 KG/M2 | WEIGHT: 164.8 LBS | SYSTOLIC BLOOD PRESSURE: 139 MMHG | HEART RATE: 98 BPM | DIASTOLIC BLOOD PRESSURE: 85 MMHG | TEMPERATURE: 97.9 F | HEIGHT: 67 IN | OXYGEN SATURATION: 98 %

## 2023-06-10 LAB
ABO + RH BLD: NORMAL
ANION GAP SERPL CALC-SCNC: ABNORMAL MMOL/L (ref 2–11)
BLOOD GROUP ANTIBODIES SERPL: NORMAL
BUN SERPL-MCNC: 14 MG/DL (ref 8–23)
CALCIUM SERPL-MCNC: 8.9 MG/DL (ref 8.3–10.4)
CHLORIDE SERPL-SCNC: 107 MMOL/L (ref 101–110)
CO2 SERPL-SCNC: 30 MMOL/L (ref 21–32)
CREAT SERPL-MCNC: 1 MG/DL (ref 0.8–1.5)
ERYTHROCYTE [DISTWIDTH] IN BLOOD BY AUTOMATED COUNT: 13.2 % (ref 11.9–14.6)
GLUCOSE SERPL-MCNC: 105 MG/DL (ref 65–100)
HCT VFR BLD AUTO: 44 % (ref 41.1–50.3)
HGB BLD-MCNC: 14.3 G/DL (ref 13.6–17.2)
MCH RBC QN AUTO: 30.4 PG (ref 26.1–32.9)
MCHC RBC AUTO-ENTMCNC: 32.5 G/DL (ref 31.4–35)
MCV RBC AUTO: 93.4 FL (ref 82–102)
NRBC # BLD: 0 K/UL (ref 0–0.2)
PLATELET # BLD AUTO: 248 K/UL (ref 150–450)
PMV BLD AUTO: 9.5 FL (ref 9.4–12.3)
POTASSIUM SERPL-SCNC: 4.9 MMOL/L (ref 3.5–5.1)
RBC # BLD AUTO: 4.71 M/UL (ref 4.23–5.6)
SODIUM SERPL-SCNC: 135 MMOL/L (ref 133–143)
SPECIMEN EXP DATE BLD: NORMAL
WBC # BLD AUTO: 12.7 K/UL (ref 4.3–11.1)

## 2023-06-10 PROCEDURE — 80048 BASIC METABOLIC PNL TOTAL CA: CPT

## 2023-06-10 PROCEDURE — 86900 BLOOD TYPING SEROLOGIC ABO: CPT

## 2023-06-10 PROCEDURE — 2500000003 HC RX 250 WO HCPCS: Performed by: NURSE ANESTHETIST, CERTIFIED REGISTERED

## 2023-06-10 PROCEDURE — 86901 BLOOD TYPING SEROLOGIC RH(D): CPT

## 2023-06-10 PROCEDURE — 6370000000 HC RX 637 (ALT 250 FOR IP): Performed by: NURSE PRACTITIONER

## 2023-06-10 PROCEDURE — 36415 COLL VENOUS BLD VENIPUNCTURE: CPT

## 2023-06-10 PROCEDURE — 6370000000 HC RX 637 (ALT 250 FOR IP): Performed by: HOSPITALIST

## 2023-06-10 PROCEDURE — 86850 RBC ANTIBODY SCREEN: CPT

## 2023-06-10 PROCEDURE — 2580000003 HC RX 258: Performed by: NURSE ANESTHETIST, CERTIFIED REGISTERED

## 2023-06-10 PROCEDURE — 6360000002 HC RX W HCPCS: Performed by: NURSE ANESTHETIST, CERTIFIED REGISTERED

## 2023-06-10 PROCEDURE — 99233 SBSQ HOSP IP/OBS HIGH 50: CPT | Performed by: UROLOGY

## 2023-06-10 PROCEDURE — 85027 COMPLETE CBC AUTOMATED: CPT

## 2023-06-10 PROCEDURE — 6370000000 HC RX 637 (ALT 250 FOR IP): Performed by: ANESTHESIOLOGY

## 2023-06-10 PROCEDURE — 6360000002 HC RX W HCPCS: Performed by: UROLOGY

## 2023-06-10 RX ORDER — TAMSULOSIN HYDROCHLORIDE 0.4 MG/1
0.4 CAPSULE ORAL DAILY
Qty: 30 CAPSULE | Refills: 3 | Status: SHIPPED | OUTPATIENT
Start: 2023-06-11

## 2023-06-10 RX ORDER — MORPHINE SULFATE 2 MG/ML
2 INJECTION, SOLUTION INTRAMUSCULAR; INTRAVENOUS
Status: DISCONTINUED | OUTPATIENT
Start: 2023-06-10 | End: 2023-06-10 | Stop reason: HOSPADM

## 2023-06-10 RX ORDER — OXYCODONE HYDROCHLORIDE AND ACETAMINOPHEN 5; 325 MG/1; MG/1
1 TABLET ORAL EVERY 4 HOURS PRN
Status: DISCONTINUED | OUTPATIENT
Start: 2023-06-10 | End: 2023-06-10 | Stop reason: HOSPADM

## 2023-06-10 RX ORDER — EPHEDRINE SULFATE/0.9% NACL/PF 50 MG/5 ML
SYRINGE (ML) INTRAVENOUS PRN
Status: DISCONTINUED | OUTPATIENT
Start: 2023-06-10 | End: 2023-06-10 | Stop reason: SDUPTHER

## 2023-06-10 RX ORDER — METOCLOPRAMIDE HYDROCHLORIDE 5 MG/ML
10 INJECTION INTRAMUSCULAR; INTRAVENOUS
Status: DISCONTINUED | OUTPATIENT
Start: 2023-06-10 | End: 2023-06-10 | Stop reason: HOSPADM

## 2023-06-10 RX ORDER — HYDRALAZINE HYDROCHLORIDE 20 MG/ML
10 INJECTION INTRAMUSCULAR; INTRAVENOUS EVERY 6 HOURS PRN
Status: DISCONTINUED | OUTPATIENT
Start: 2023-06-10 | End: 2023-06-10 | Stop reason: HOSPADM

## 2023-06-10 RX ORDER — DEXAMETHASONE SODIUM PHOSPHATE 4 MG/ML
INJECTION, SOLUTION INTRA-ARTICULAR; INTRALESIONAL; INTRAMUSCULAR; INTRAVENOUS; SOFT TISSUE PRN
Status: DISCONTINUED | OUTPATIENT
Start: 2023-06-10 | End: 2023-06-10 | Stop reason: SDUPTHER

## 2023-06-10 RX ORDER — OXYCODONE HYDROCHLORIDE 5 MG/1
5 TABLET ORAL
Status: COMPLETED | OUTPATIENT
Start: 2023-06-10 | End: 2023-06-10

## 2023-06-10 RX ORDER — ONDANSETRON 2 MG/ML
INJECTION INTRAMUSCULAR; INTRAVENOUS PRN
Status: DISCONTINUED | OUTPATIENT
Start: 2023-06-10 | End: 2023-06-10 | Stop reason: SDUPTHER

## 2023-06-10 RX ORDER — FENTANYL CITRATE 50 UG/ML
25 INJECTION, SOLUTION INTRAMUSCULAR; INTRAVENOUS EVERY 5 MIN PRN
Status: DISCONTINUED | OUTPATIENT
Start: 2023-06-10 | End: 2023-06-10 | Stop reason: HOSPADM

## 2023-06-10 RX ORDER — LIDOCAINE HYDROCHLORIDE 20 MG/ML
INJECTION, SOLUTION EPIDURAL; INFILTRATION; INTRACAUDAL; PERINEURAL PRN
Status: DISCONTINUED | OUTPATIENT
Start: 2023-06-10 | End: 2023-06-10 | Stop reason: SDUPTHER

## 2023-06-10 RX ORDER — GLYCOPYRROLATE 0.2 MG/ML
INJECTION INTRAMUSCULAR; INTRAVENOUS PRN
Status: DISCONTINUED | OUTPATIENT
Start: 2023-06-10 | End: 2023-06-10 | Stop reason: SDUPTHER

## 2023-06-10 RX ORDER — SODIUM CHLORIDE, SODIUM LACTATE, POTASSIUM CHLORIDE, CALCIUM CHLORIDE 600; 310; 30; 20 MG/100ML; MG/100ML; MG/100ML; MG/100ML
INJECTION, SOLUTION INTRAVENOUS CONTINUOUS
Status: DISCONTINUED | OUTPATIENT
Start: 2023-06-10 | End: 2023-06-10 | Stop reason: HOSPADM

## 2023-06-10 RX ORDER — PROPOFOL 10 MG/ML
INJECTION, EMULSION INTRAVENOUS PRN
Status: DISCONTINUED | OUTPATIENT
Start: 2023-06-10 | End: 2023-06-10 | Stop reason: SDUPTHER

## 2023-06-10 RX ORDER — ONDANSETRON 2 MG/ML
4 INJECTION INTRAMUSCULAR; INTRAVENOUS
Status: DISCONTINUED | OUTPATIENT
Start: 2023-06-10 | End: 2023-06-10 | Stop reason: HOSPADM

## 2023-06-10 RX ORDER — DIPHENHYDRAMINE HYDROCHLORIDE 50 MG/ML
12.5 INJECTION INTRAMUSCULAR; INTRAVENOUS
Status: DISCONTINUED | OUTPATIENT
Start: 2023-06-10 | End: 2023-06-10 | Stop reason: HOSPADM

## 2023-06-10 RX ORDER — HYDROMORPHONE HYDROCHLORIDE 2 MG/ML
0.5 INJECTION, SOLUTION INTRAMUSCULAR; INTRAVENOUS; SUBCUTANEOUS EVERY 10 MIN PRN
Status: DISCONTINUED | OUTPATIENT
Start: 2023-06-10 | End: 2023-06-10 | Stop reason: HOSPADM

## 2023-06-10 RX ORDER — SODIUM CHLORIDE, SODIUM LACTATE, POTASSIUM CHLORIDE, CALCIUM CHLORIDE 600; 310; 30; 20 MG/100ML; MG/100ML; MG/100ML; MG/100ML
INJECTION, SOLUTION INTRAVENOUS CONTINUOUS PRN
Status: DISCONTINUED | OUTPATIENT
Start: 2023-06-10 | End: 2023-06-10 | Stop reason: SDUPTHER

## 2023-06-10 RX ORDER — NEOSTIGMINE METHYLSULFATE 1 MG/ML
INJECTION, SOLUTION INTRAVENOUS PRN
Status: DISCONTINUED | OUTPATIENT
Start: 2023-06-10 | End: 2023-06-10 | Stop reason: SDUPTHER

## 2023-06-10 RX ORDER — ROCURONIUM BROMIDE 10 MG/ML
INJECTION, SOLUTION INTRAVENOUS PRN
Status: DISCONTINUED | OUTPATIENT
Start: 2023-06-10 | End: 2023-06-10 | Stop reason: SDUPTHER

## 2023-06-10 RX ORDER — FINASTERIDE 5 MG/1
5 TABLET, FILM COATED ORAL DAILY
Qty: 30 TABLET | Refills: 3 | Status: SHIPPED | OUTPATIENT
Start: 2023-06-11

## 2023-06-10 RX ADMIN — Medication 3 MG: at 09:07

## 2023-06-10 RX ADMIN — HYOSCYAMINE SULFATE 125 MCG: 0.12 TABLET ORAL; SUBLINGUAL at 12:59

## 2023-06-10 RX ADMIN — ONDANSETRON 4 MG: 2 INJECTION INTRAMUSCULAR; INTRAVENOUS at 08:09

## 2023-06-10 RX ADMIN — SODIUM CHLORIDE, SODIUM LACTATE, POTASSIUM CHLORIDE, AND CALCIUM CHLORIDE: 600; 310; 30; 20 INJECTION, SOLUTION INTRAVENOUS at 07:31

## 2023-06-10 RX ADMIN — LIDOCAINE HYDROCHLORIDE 100 MG: 20 INJECTION, SOLUTION EPIDURAL; INFILTRATION; INTRACAUDAL; PERINEURAL at 07:56

## 2023-06-10 RX ADMIN — SODIUM CHLORIDE, SODIUM LACTATE, POTASSIUM CHLORIDE, AND CALCIUM CHLORIDE: 600; 310; 30; 20 INJECTION, SOLUTION INTRAVENOUS at 08:39

## 2023-06-10 RX ADMIN — PROPOFOL 150 MG: 10 INJECTION, EMULSION INTRAVENOUS at 07:56

## 2023-06-10 RX ADMIN — NOREPINEPHRINE BITARTRATE 200 MCG: 1 SOLUTION INTRAVENOUS at 08:58

## 2023-06-10 RX ADMIN — Medication 10 MG: at 08:57

## 2023-06-10 RX ADMIN — NOREPINEPHRINE BITARTRATE 200 MCG: 1 SOLUTION INTRAVENOUS at 08:12

## 2023-06-10 RX ADMIN — LORAZEPAM 1 MG: 1 TABLET ORAL at 14:29

## 2023-06-10 RX ADMIN — DEXAMETHASONE SODIUM PHOSPHATE 4 MG: 4 INJECTION, SOLUTION INTRAMUSCULAR; INTRAVENOUS at 08:09

## 2023-06-10 RX ADMIN — ACETAMINOPHEN 650 MG: 325 TABLET ORAL at 02:52

## 2023-06-10 RX ADMIN — Medication 2000 MG: at 08:05

## 2023-06-10 RX ADMIN — NOREPINEPHRINE BITARTRATE 100 MCG: 1 SOLUTION INTRAVENOUS at 08:01

## 2023-06-10 RX ADMIN — NOREPINEPHRINE BITARTRATE 100 MCG: 1 SOLUTION INTRAVENOUS at 08:03

## 2023-06-10 RX ADMIN — ROCURONIUM BROMIDE 50 MG: 10 INJECTION INTRAVENOUS at 07:56

## 2023-06-10 RX ADMIN — Medication 10 MG: at 08:58

## 2023-06-10 RX ADMIN — Medication 20 MG: at 08:13

## 2023-06-10 RX ADMIN — OXYCODONE HYDROCHLORIDE 5 MG: 5 TABLET ORAL at 10:01

## 2023-06-10 RX ADMIN — GLYCOPYRROLATE 0.4 MG: 0.2 INJECTION INTRAMUSCULAR; INTRAVENOUS at 09:07

## 2023-06-10 RX ADMIN — NOREPINEPHRINE BITARTRATE 200 MCG: 1 SOLUTION INTRAVENOUS at 09:10

## 2023-06-10 RX ADMIN — Medication 10 MG: at 09:10

## 2023-06-10 ASSESSMENT — PAIN DESCRIPTION - LOCATION
LOCATION: PENIS
LOCATION: PENIS

## 2023-06-10 ASSESSMENT — PAIN DESCRIPTION - PAIN TYPE: TYPE: ACUTE PAIN

## 2023-06-10 ASSESSMENT — PAIN SCALES - GENERAL
PAINLEVEL_OUTOF10: 0
PAINLEVEL_OUTOF10: 3
PAINLEVEL_OUTOF10: 8

## 2023-06-10 ASSESSMENT — PAIN DESCRIPTION - DESCRIPTORS
DESCRIPTORS: DISCOMFORT
DESCRIPTORS: BURNING

## 2023-06-10 ASSESSMENT — PAIN - FUNCTIONAL ASSESSMENT
PAIN_FUNCTIONAL_ASSESSMENT: 0-10
PAIN_FUNCTIONAL_ASSESSMENT: ACTIVITIES ARE NOT PREVENTED

## 2023-06-10 NOTE — PLAN OF CARE
Problem: Discharge Planning  Goal: Discharge to home or other facility with appropriate resources  Outcome: Progressing  Flowsheets (Taken 6/9/2023 1935)  Discharge to home or other facility with appropriate resources:   Identify barriers to discharge with patient and caregiver   Arrange for needed discharge resources and transportation as appropriate   Identify discharge learning needs (meds, wound care, etc)     Problem: Safety - Adult  Goal: Free from fall injury  Outcome: Progressing     Problem: ABCDS Injury Assessment  Goal: Absence of physical injury  Outcome: Progressing     Problem: Pain  Goal: Verbalizes/displays adequate comfort level or baseline comfort level  Outcome: Progressing     Problem: Genitourinary - Adult  Goal: Urinary catheter remains patent  Outcome: Progressing  Flowsheets (Taken 6/9/2023 1935)  Urinary catheter remains patent: Assess patency of urinary catheter

## 2023-06-10 NOTE — ANESTHESIA POSTPROCEDURE EVALUATION
Department of Anesthesiology  Postprocedure Note    Patient: Asim Kenny  MRN: [id number]  YOB: 1950  Date of evaluation: 6/10/2023      Procedure Summary     Date: 06/10/23 Room / Location: [facility] MAIN OR 01 CYSTO / [facility] MAIN OR    Anesthesia Start: 0750 Anesthesia Stop: 0930    Procedure: CYSTOSCOPY TRANSURETHRAL RESECTION PROSTATE/ rm 306 (Urethra) Diagnosis:       BPH with urinary obstruction      Incomplete bladder emptying      (BPH with urinary obstruction [N40.1, N13.8])      (Incomplete bladder emptying [R33.9])    Providers: Vipin Swain MD Responsible Provider: Aline Norris MD    Anesthesia Type: general ASA Status: 3          Anesthesia Type: No value filed.     Nini Phase I: Nini Score: 10    Nini Phase II:        Anesthesia Post Evaluation    Patient location during evaluation: PACU  Patient participation: complete - patient participated  Level of consciousness: awake and awake and alert  Airway patency: patent  Nausea & Vomiting: no nausea  Complications: no  Cardiovascular status: hemodynamically stable  Respiratory status: acceptable  Hydration status: euvolemic  Multimodal analgesia pain management approach

## 2023-06-10 NOTE — DISCHARGE SUMMARY
Hospitalist Discharge Summary   Admit Date:  2023 11:44 PM   DC Note date: 6/10/2023  Name:  Deberah Sicard   Age:  72 y.o. Sex:  male  :  1950   MRN:  [id number]   Room:  [facility]  PCP:  On File Not (Inactive)    Presenting Complaint: Urinary Retention     Initial Admission Diagnosis: Urinary retention [R33.9]  Homelessness [Z59.00]  Bladder outlet obstruction [N32.0]     Problem List for this Hospitalization (present on admission):    Principal Problem:    BPH with urinary obstruction  Active Problems:    Essential hypertension    Homeless    Urinary retention    History of alcohol abuse    Methamphetamine abuse ([address]    Polysubstance abuse ([facility] [address]    Bladder diverticulum    Bladder neck obstruction    Bladder outlet obstruction  Resolved Problems:    * No resolved hospital problems. *      Hospital Course:  79-year-old CM w/ a PMH of polysubstance abuse and prostate cancer admitted on  with suprapubic pain and difficulty urinating and was found to have urinary retention with 1.2 L of urine output after Franco catheter was placed. Urology was consulted. CT shows bladder diverticula with obstruction. He was taken for TURP w/ Dr. Cristian Costello on Meir/10. The patient was in no distress after being seen when he returned from the PACU. He was belligerent. CBI was infusing and he had no gross hematuria. Later in the afternoon the patient demanded to leave the hospital and said he was going to walk out. Nursing contacted Dr. Cristian Costello who stated that if the patient planned to leave [facility] that nursing could remove his Franco catheter and he could go. I saw the patient again after the code violet was called and he refused examination and stated he would sign AMA paperwork in order to leave the hospital.  He was informed of the risks of leaving AM including possible death. He decided to leave [facility] anyway. Mr. Zahra Murcia left the hospital [address] on Komal 10, 2023.     Urinary retention

## 2023-06-10 NOTE — ADDENDUM NOTE
Addendum  created 06/10/23 1332 by MIRACLE Ross CRNA    Intraprocedure Event edited, Intraprocedure Staff edited

## 2023-06-10 NOTE — ANESTHESIA PROCEDURE NOTES
Airway  Date/Time: 6/10/2023 7:59 AM  Urgency: elective    Airway not difficult    General Information and Staff    Patient location during procedure: OR  Resident/CRNA: MIRACLE Kimball CRNA  Performed: resident/CRNA     Indications and Patient Condition  Indications for airway management: anesthesia  Spontaneous Ventilation: absent  Sedation level: deep  Preoxygenated: yes  Patient position: sniffing  MILS maintained throughout  Mask difficulty assessment: vent by bag mask + OA or adjuvant +/- NMBA    Final Airway Details  Final airway type: endotracheal airway      Successful airway: ETT  Cuffed: yes   Successful intubation technique: direct laryngoscopy  Facilitating devices/methods: intubating stylet  Endotracheal tube insertion site: oral  Blade: Tom  Blade size: #4  ETT size (mm): 8.0  Cormack-Lehane Classification: grade I - full view of glottis  Placement verified by: chest auscultation and capnometry   Measured from: gums  ETT to gums (cm): 23  Number of attempts at approach: 1  Ventilation between attempts: bag mask  Number of other approaches attempted: 0    no

## 2023-06-10 NOTE — OP NOTE
[facility]  OPERATIVE REPORT    Name:  Riaz Boothe  MR#:  [id number]  :  1950  ACCOUNT #:  [de-identified]  DATE OF SERVICE:  06/10/2023    PREOPERATIVE DIAGNOSIS:  Benign prostatic hypertrophy with urinary retention. POSTOPERATIVE DIAGNOSIS:  Benign prostatic hypertrophy with urinary retention. PROCEDURE PERFORMED:  Cystoscopy, bipolar transurethral resection of prostate. SURGEON:  Collette Altes, MD    ASSISTANT:  None. ANESTHESIA:  General.    COMPLICATIONS:  None. SPECIMENS REMOVED:  Prostate chips for pathology. IMPLANTS:  None. ESTIMATED BLOOD LOSS:  Less than 50 mL. DRAINS:  Franco catheter 24 three-way with 30 mL sterile water and balloon. FINDINGS:  Trilobe BPH causing complete visual obstruction, right lateral wall large bladder diverticulum likely from chronic bladder outlet obstruction, +2 bladder trabeculations, otherwise no abnormalities. INDICATIONS:  The patient is a 82-year-old gentleman with a history of polysubstance abuse and social issues who is currently homeless, who presented to the emergency room with acute urinary retention. Franco catheter was placed with immediate relief of the urinary retention. Urology was consulted and management recommended for his urinary retention to proceed with a TURP to open up his bladder outlet and prostate wall. He was admitted due to concern for lost to followup with an indwelling Franco catheter. After risk-benefit discussion was had about the procedure, he opted to proceed with a TURP today. It should be noted he also had a preop CT that showed a 4-cm bladder diverticulum on the right lateral wall and prostate enlargement consistent with enlarged prostate and bladder outlet obstruction. PROCEDURE:  After informed consent was obtained, and patient was identified in preoperative holding area, he was taken back to the operating suite, placed on the table in the supine position.   Time-out was

## 2023-06-10 NOTE — BRIEF OP NOTE
(Describe) 06/10/23 0320   Catheter Care  Perineal wipes 06/10/23 0320   Catheter Best Practices  Drainage tube clipped to bed;Catheter secured to thigh; Tamper seal intact; Bag below bladder;Bag not on floor; Lack of dependent loop in tubing;Drainage bag less than half full 06/10/23 0320   Status Draining;Patent 06/10/23 0320   Output (mL) 275 mL 06/10/23 0630       Findings: trilobe BPH causing complete visual obstruction. Right lateral wall bladder diverticulum likely from chronic DALAL.        Electronically signed by Jaya Snyder MD on 6/10/2023 at 9:45 AM

## 2023-06-10 NOTE — PROGRESS NOTES
AFTER MIDNIGHT UPDATE PROGRESS NOTE:  I personally saw and examined the patient. They were admitted after midnight by my partner. 44-year-old polysubstance abuser, alcoholic homeless male with history of prostate cancer admitted on 6/6 with suprapubic pain and difficulty urination and was found to have urinary retention with 1.2 L of urine output once Franco catheter was placed. Urology was consulted. CT shows bladder diverticula with obstruction. Physical Exam:   General:        Well nourished. Very somnolent  Head:             Normocephalic, atraumatic  Eyes:             Sclerae appear normal.  Pupils equally round. ENT:              Nares appear normal, no drainage. Moist oral mucosa  Neck:             No restricted ROM. Trachea midline         CV:                RRR. No m/r/g. No jugular venous distension. Lungs:           CTAB. No wheezing, rhonchi, or rales. Abdomen: Bowel sounds present. Soft, nontender, nondistended. Extremities:   No cyanosis or clubbing. No edema  Skin:              No rashes and normal coloration. Warm and dry. Neuro:           Cranial nerves II-XII grossly intact. Sensation intact  Psych:           Very somnolent    Principal Problem:    Urinary retention  Active Problems:    Benign prostatic hyperplasia    Essential hypertension    Homeless    History of alcohol abuse    Methamphetamine abuse ([address]    Polysubstance abuse ([address]  Resolved Problems:    * No resolved hospital problems. *      Plan:  Continue Franco catheter  Start Flomax  Appreciate urology's input and assistance  Alcohol withdrawal protocols, [facility] protocol    No charge to the patient for this encounter.     Signed:  Myrna Lacey DO
Hospitalist Progress Note   Admit Date:  2023 11:44 PM   Name:  Claribel Lacey   Age:  72 y.o. Sex:  male  :  1950   MRN:  [id number]   Room:  [facility]    Presenting/Chief Complaint: Urinary Retention     Reason(s) for Admission: Urinary retention [R33.9]  Homelessness [Z59.00]  Bladder outlet obstruction [N32.0]     Hospital Course:   72-year-old CM w/ a PMH of polysubstance abuse and prostate cancer admitted on  with suprapubic pain and difficulty urinating and was found to have urinary retention with 1.2 L of urine output after Franco catheter was placed. Urology was consulted. CT shows bladder diverticula with obstruction. He was taken for TURP w/ Dr. Francoise Dukes on Meir/10. Subjective & 24hr Events (06/10/23): Patient seen after returning from OR. Sitting up w/ lunch tray. He says he cannot void; he was reminded that he is undergoing CBI and is flushing well into Franco bag. No gross hematuria. I ordered a now dose of Levsin. Assessment & Plan:     Urinary retention 2/2 BPH and bladder neck obstruction  Continue Flomax  Continue Proscar  To OR for cystoscopy, bipolar transurethral resection of prostate on Saturday, Meir/10  Now w/ CBI  PRN Levsin  PRN analgesia     Essential hypertension  - Control pain  - PRN hydralazine    History of alcohol abuse  No current signs of withdrawal  Continue CIWA protocol  Continue thiamine daily     Constipation   - Senokot and PRN MiraLAX     Methamphetamine abuse / Polysubstance abuse   - counseled to quit    Homeless  - Noted       PT/OT evals and PPD needed/ordered? No  Diet:  ADULT DIET;  Regular  VTE prophylaxis: SCD's   Code status: Full Code    Hospital Problems:  Principal Problem:    BPH with urinary obstruction  Active Problems:    Essential hypertension    Homeless    Urinary retention    History of alcohol abuse    Methamphetamine abuse (HCC)    Polysubstance abuse (HCC)    Bladder diverticulum    Bladder neck obstruction
Hourly rounding completed during shift. All needs met at this time. Bed L/L with call bell in reach. Report given to oncoming RN.
Pt arrived to unit from 3rd floor. Resting in bed, raymundo intact. No needs at this time.
Pt had become increasing agitated and started calling the switch board, security and 911 stating that we were keeping him against his will and wanted to leave. Attempted to educate pt the importance of staying in the hospital at this time. Pt became increasing agitated and called security. Robbie cisneros called. Waqar Villanueva MD and Bakari Pro NP made aware. Pt A/o x4, answered all questions appropriately, Pt  signed AMA paperwork and escorted out by security.
Spiritual Care Visit, initial visit. Visited with patient at bedside. Prayed for patient's healing and health. Visit by Lenita Soulier Larraine Marten, Staff .  Mariya., Conchita.B., B.A.
TRANSFER - IN REPORT:    Verbal report received from Boone George on Eliud Faust  being received from [facility] for routine progression of patient care      Report consisted of patient's Situation, Background, Assessment and   Recommendations(SBAR). Information from the following report(s) Nurse Handoff Report, Surgery Report, and Intake/Output was reviewed with the receiving nurse. Opportunity for questions and clarification was provided. Assessment completed upon patient's arrival to unit and care assumed.
TRANSFER - IN REPORT:    Verbal report received from Melida bird RN on Zoraida Perry  being received from ED for routine progression of patient care      Report consisted of patient's Situation, Background, Assessment and   Recommendations(SBAR). Information from the following report(s) Nurse Handoff Report, ED Encounter Summary, ED SBAR, [facility], and Recent Results was reviewed with the receiving nurse. Opportunity for questions and clarification was provided. Assessment completed upon patient's arrival to unit and care assumed. Patient arrived to room 306 with raymundo catheter in place. Patient oriented to room and call light and instructed to call for any assistance. Patient's bed low, locked, and bed alarm on.
TRANSFER - IN REPORT:    Verbal report received from SONDRA Lipscomb on Ny [address]  being received from PACU for routine post-op      Report consisted of patient's Situation, Background, Assessment and   Recommendations(SBAR). Information from the following report(s) Surgery Report and Recent Results was reviewed with the receiving nurse. Opportunity for questions and clarification was provided. Assessment to be completed upon patient's arrival to unit and care assumed.
TRANSFER - OUT REPORT:    Verbal report given to Macarena Plaza RN on Kena Perales being transferred to [address] [place] [address] for routine progression of patient care       Report consisted of patients Situation, Background, Assessment and Recommendations (SBAR). Information from the following report(s) SBAR, MAR, and Recent Results was reviewed with the receiving nurse. Opportunity for questions and clarification was provided.
TRANSFER - OUT REPORT:    Verbal report given to SONDRA Doshi on Zoraida Perry  being transferred to Pre-op for ordered procedure       Report consisted of patient's Situation, Background, Assessment and   Recommendations(SBAR). Information from the following report(s) Nurse Handoff Report was reviewed with the receiving nurse. [place] Assessment: No data recorded  Lines:   Peripheral IV 06/09/23 Left;Posterior Forearm (Active)   Site Assessment Clean, dry & intact 06/10/23 0321   Line Status Capped 06/10/23 0321   Line Care Connections checked and tightened 06/10/23 0321   Phlebitis Assessment No symptoms 06/10/23 0321   Infiltration Assessment 0 06/10/23 0321   Alcohol Cap Used Yes 06/10/23 0321   Dressing Status New dressing applied 06/10/23 0321   Dressing Type Transparent 06/10/23 0321        Opportunity for questions and clarification was provided.       Patient transported with:  Registered Nurse
Unable to look at patient's sacrum due to patient being in pain and not wanting to turn to allow me to look. Will pass along to dayshift RN to complete skin assessment. 2017 Skin assessment not completed/documented on dayshift. Skin assessment completed with Ned Aschoff, RN. Patient's heels and sacrum without any redness or injury.
Urology consulted, TURP recommended, pt in agreement. CM attempted to see the pt again but was \"sleeping\" again and would not respond.  Will revisit
Homeless    History of alcohol abuse    Methamphetamine abuse ([facility] [address]    Polysubstance abuse ([facility] [address]    Bladder diverticulum    Bladder neck obstruction    Bladder outlet obstruction  Resolved Problems:    * No resolved hospital problems. *    Raymundo draining yellow UOP. VSS. Labs reviewed all WDL. Pre-Op Diagnosis: BPH with urinary obstruction [N40.1, N13.8]  Incomplete bladder emptying [R33.9]    Post-Op Diagnosis:  * No post-op diagnosis entered *    Procedures: Procedure(s):  CYSTOSCOPY TRANSURETHRAL RESECTION PROSTATE      Plan:   Keep raymundo. NPO after MN. TURP tomorrow with Dr. Waqar Villanueva. Signed By: JENI Krishna     June 9, 2023      [facility]    Urology Attending Physician Note:     Admit Date: 6/5/2023    Subjective:     No acute events overnight. Raymundo remains in place. TURP scheduled for tomorrow. Objective:     Patient Vitals for the past 8 hrs:   BP Temp Temp src Pulse Resp SpO2   06/09/23 1712 128/82 98.4 °F (36.9 °C) Oral 82 18 98 %   06/09/23 1238 (!) 133/99 97.4 °F (36.3 °C) Oral 81 18 96 %     06/09 0701 - 06/09 1900  In: 480 [P.O.:480]  Out: 1600 [Urine:1600]  06/07 1901 - 06/09 0700  In: -   Out: 3200 [Urine:3200]    Physical Exam:   /82   Pulse 82   Temp 98.4 °F (36.9 °C) (Oral)   Resp 18   Ht 5' 7\" (1.702 m)   Wt 156 lb 15.5 oz (71.2 kg)   SpO2 98%   BMI 24.58 kg/m²      GENERAL: No acute distress, Awake, Alert, Oriented X 3, Gait normal  CARDIAC: regular rate and rhythm  CHEST AND LUNG: Easy work of breathing  ABDOMEN: soft, non tender, non-distended  : Raymundo draining clear yellow urine.          Data Review   Recent Results (from the past 24 hour(s))   Basic Metabolic Panel w/ Reflex to MG    Collection Time: 06/09/23  5:32 AM   Result Value Ref Range    Sodium 140 133 - 143 mmol/L    Potassium 4.0 3.5 - 5.1 mmol/L    Chloride 109 101 - 110 mmol/L    CO2 26 21 - 32 mmol/L    Anion Gap 5 2 - 11 mmol/L    Glucose 107 (H) 65 - 100 mg/dL    BUN
expiration date 06/13/2023,6003     ABO/Rh A POSITIVE     Antibody Screen PENDING            Assessment:     Principal Problem:    Urinary retention  Active Problems:    Benign prostatic hyperplasia    Essential hypertension    Homeless    History of alcohol abuse    Methamphetamine abuse ([facility] [address]    Polysubstance abuse ([facility] [address]    Bladder diverticulum    Bladder neck obstruction    Bladder outlet obstruction  Resolved Problems:    * No resolved hospital problems. *    81 year old male with urinary retention secondary to BPH who presents for TURP today    Plan:     -To OR for cystoscopy, bipolar trans-urethral resection of prostate  -Consented  -Antibiotic on call to OR  -NPO for procedure.   -Risks/benefits reviewed including bleeding, infection, persistent urinary retention, prostate regrowth, injury to surrounding structures, risk of anesthesia. Patient understands and wishes to proceed. Fredrick Gibbs M.D.     [facility]  [facility] [place] [address]  [facility], [address]  Phone: [phone]  Fax: [phone]
5 2 - 11 mmol/L    Glucose 80 65 - 100 mg/dL    BUN 14 8 - 23 MG/DL    Creatinine 0.90 0.8 - 1.5 MG/DL    Est, Glom Filt Rate >60 >60 ml/min/1.73m2    Calcium 8.2 (L) 8.3 - 10.4 MG/DL    Total Bilirubin 0.6 0.2 - 1.1 MG/DL    ALT 37 12 - 65 U/L    AST 39 (H) 15 - 37 U/L    Alk Phosphatase 59 50 - 136 U/L    Total Protein 5.4 (L) 6.3 - 8.2 g/dL    Albumin 2.5 (L) 3.2 - 4.6 g/dL    Globulin 2.9 2.8 - 4.5 g/dL    Albumin/Globulin Ratio 0.9 0.4 - 1.6     CBC with Auto Differential    Collection Time: 06/07/23  3:00 AM   Result Value Ref Range    WBC 10.6 4.3 - 11.1 K/uL    RBC 4.38 4.23 - 5.6 M/uL    Hemoglobin 13.4 (L) 13.6 - 17.2 g/dL    Hematocrit 39.7 (L) 41.1 - 50.3 %    MCV 90.6 82 - 102 FL    MCH 30.6 26.1 - 32.9 PG    MCHC 33.8 31.4 - 35.0 g/dL    RDW 13.3 11.9 - 14.6 %    Platelets 832 789 - 268 K/uL    MPV 9.9 9.4 - 12.3 FL    nRBC 0.00 0.0 - 0.2 K/uL    Differential Type AUTOMATED      Neutrophils % 76 43 - 78 %    Lymphocytes % 15 13 - 44 %    Monocytes % 6 4.0 - 12.0 %    Eosinophils % 2 0.5 - 7.8 %    Basophils % 0 0.0 - 2.0 %    Immature Granulocytes 1 0.0 - 5.0 %    Neutrophils Absolute 8.0 1.7 - 8.2 K/UL    Lymphocytes Absolute 1.6 0.5 - 4.6 K/UL    Monocytes Absolute 0.7 0.1 - 1.3 K/UL    Eosinophils Absolute 0.2 0.0 - 0.8 K/UL    Basophils Absolute 0.0 0.0 - 0.2 K/UL    Absolute Immature Granulocyte 0.1 0.0 - 0.5 K/UL   Magnesium    Collection Time: 06/07/23  3:00 AM   Result Value Ref Range    Magnesium 1.9 1.8 - 2.4 mg/dL       Current Meds:  Current Facility-Administered Medications   Medication Dose Route Frequency    finasteride (PROSCAR) tablet 5 mg  5 mg Oral Daily    sodium chloride flush 0.9 % injection 5-40 mL  5-40 mL IntraVENous 2 times per day    sodium chloride flush 0.9 % injection 5-40 mL  5-40 mL IntraVENous PRN    0.9 % sodium chloride infusion   IntraVENous PRN    ondansetron (ZOFRAN-ODT) disintegrating tablet 4 mg  4 mg Oral Q8H PRN    Or    ondansetron (ZOFRAN) injection 4 mg  4
In addition to my documentation above, I have also reviewed the nurse practitioner note and personally examined the patient. I agree with the HPI, exam, assessment and plan. Fredrick Dukes M.D.     [facility]  [address]  Phone: [phone]  Fax: [phone]
note.  I agree with the HPI, exam, assessment and plan as outlined by the nurse practitioner. Fredrick Costello M.D.     [facility]  [address]  Phone: [phone]  Fax: [phone]
PRN    Or    LORazepam (ATIVAN) tablet 3 mg  3 mg Oral Q1H PRN    Or    LORazepam (ATIVAN) injection 3 mg  3 mg IntraVENous Q1H PRN    Or    LORazepam (ATIVAN) tablet 4 mg  4 mg Oral Q1H PRN    Or    LORazepam (ATIVAN) injection 4 mg  4 mg IntraVENous Q1H PRN    tamsulosin (FLOMAX) capsule 0.4 mg  0.4 mg Oral Daily       Signed:  JULIO Monzon    Part of this note may have been written by using a voice dictation software. The note has been proof read but may still contain some grammatical/other typographical errors.
IntraVENous 2 times per day    sodium chloride flush 0.9 % injection 5-40 mL  5-40 mL IntraVENous PRN    0.9 % sodium chloride infusion   IntraVENous PRN    thiamine tablet 100 mg  100 mg Oral Daily    LORazepam (ATIVAN) tablet 1 mg  1 mg Oral Q1H PRN    Or    LORazepam (ATIVAN) injection 1 mg  1 mg IntraVENous Q1H PRN    Or    LORazepam (ATIVAN) tablet 2 mg  2 mg Oral Q1H PRN    Or    LORazepam (ATIVAN) injection 2 mg  2 mg IntraVENous Q1H PRN    Or    LORazepam (ATIVAN) tablet 3 mg  3 mg Oral Q1H PRN    Or    LORazepam (ATIVAN) injection 3 mg  3 mg IntraVENous Q1H PRN    Or    LORazepam (ATIVAN) tablet 4 mg  4 mg Oral Q1H PRN    Or    LORazepam (ATIVAN) injection 4 mg  4 mg IntraVENous Q1H PRN    tamsulosin (FLOMAX) capsule 0.4 mg  0.4 mg Oral Daily       Signed:  Rober Fraser DO  6/7/2023    Part of this note may have been written by using a voice dictation software. The note has been proof read but may still contain some grammatical/other typographical errors.

## 2023-06-10 NOTE — PERIOP NOTE
TRANSFER - OUT REPORT:    Verbal report given to Garrett Blake RN on Desirae Sat  being transferred to [address] [place] [address] for routine progression of patient care       Report consisted of patient's Situation, Background, Assessment and   Recommendations(SBAR). Information from the following report(s) Nurse Handoff Report, Adult Overview, Surgery Report, Intake/Output, MAR, and Cardiac Rhythm NSR  was reviewed with the receiving nurse. [place] Assessment: No data recorded  Lines:   Peripheral IV 06/09/23 Left;Posterior Forearm (Active)   Site Assessment Clean, dry & intact 06/10/23 0929   Line Status Flushed 06/10/23 0929   Line Care Connections checked and tightened 06/10/23 0644   Phlebitis Assessment No symptoms 06/10/23 0929   Infiltration Assessment 0 06/10/23 0929   Alcohol Cap Used Yes 06/10/23 0644   Dressing Status Clean, dry & intact 06/10/23 0929   Dressing Type Transparent 06/10/23 0929       Peripheral IV 06/10/23 Posterior;Right Hand (Active)   Site Assessment Clean, dry & intact 06/10/23 0929   Line Status Flushed 06/10/23 0929   Line Care Connections checked and tightened 06/10/23 0705   Phlebitis Assessment No symptoms 06/10/23 0929   Infiltration Assessment 0 06/10/23 0929   Alcohol Cap Used No 06/10/23 0705   Dressing Status Clean, dry & intact 06/10/23 0929   Dressing Type Transparent 06/10/23 0929        Opportunity for questions and clarification was provided.       Patient transported with:  O2 @ 2lpm

## 2023-06-18 ENCOUNTER — HOSPITAL ENCOUNTER (INPATIENT)
Age: 73
LOS: 3 days | Discharge: HOME | DRG: 699 | End: 2023-06-21
Attending: EMERGENCY MEDICINE | Admitting: INTERNAL MEDICINE
Payer: MEDICARE

## 2023-06-18 ENCOUNTER — APPOINTMENT (OUTPATIENT)
Dept: ULTRASOUND IMAGING | Age: 73
DRG: 699 | End: 2023-06-18
Payer: MEDICARE

## 2023-06-18 DIAGNOSIS — N50.812 LEFT TESTICULAR PAIN: ICD-10-CM

## 2023-06-18 DIAGNOSIS — N45.1 ACUTE EPIDIDYMITIS: Primary | ICD-10-CM

## 2023-06-18 PROBLEM — N39.0 UTI (URINARY TRACT INFECTION): Status: ACTIVE | Noted: 2023-06-18

## 2023-06-18 PROBLEM — Z90.79 H/O TRANSURETHRAL RESECTION OF PROSTATE: Status: ACTIVE | Noted: 2023-06-18

## 2023-06-18 PROBLEM — E87.20 LACTIC ACIDOSIS: Status: ACTIVE | Noted: 2023-06-18

## 2023-06-18 PROBLEM — Z85.46 HISTORY OF PROSTATE CANCER: Status: ACTIVE | Noted: 2023-06-18

## 2023-06-18 PROBLEM — Z98.890 H/O TRANSURETHRAL RESECTION OF PROSTATE: Status: ACTIVE | Noted: 2023-06-18

## 2023-06-18 PROBLEM — D72.829 LEUKOCYTOSIS: Status: ACTIVE | Noted: 2023-06-18

## 2023-06-18 LAB
ALBUMIN SERPL-MCNC: 2.3 G/DL (ref 3.2–4.6)
ALBUMIN/GLOB SERPL: 0.7 (ref 0.4–1.6)
ALP SERPL-CCNC: 70 U/L (ref 50–136)
ALT SERPL-CCNC: 27 U/L (ref 12–65)
ANION GAP SERPL CALC-SCNC: 4 MMOL/L (ref 2–11)
APPEARANCE UR: ABNORMAL
AST SERPL-CCNC: 18 U/L (ref 15–37)
BACTERIA URNS QL MICRO: ABNORMAL /HPF
BASOPHILS # BLD: 0 K/UL (ref 0–0.2)
BASOPHILS NFR BLD: 0 % (ref 0–2)
BILIRUB SERPL-MCNC: 0.3 MG/DL (ref 0.2–1.1)
BILIRUB UR QL: NEGATIVE
BUN SERPL-MCNC: 19 MG/DL (ref 8–23)
CALCIUM SERPL-MCNC: 8 MG/DL (ref 8.3–10.4)
CASTS URNS QL MICRO: 0 /LPF
CHLORIDE SERPL-SCNC: 109 MMOL/L (ref 101–110)
CO2 SERPL-SCNC: 25 MMOL/L (ref 21–32)
COLOR UR: ABNORMAL
CREAT SERPL-MCNC: 1.2 MG/DL (ref 0.8–1.5)
CRYSTALS URNS QL MICRO: 0 /LPF
DIFFERENTIAL METHOD BLD: ABNORMAL
EKG ATRIAL RATE: 73 BPM
EKG DIAGNOSIS: NORMAL
EKG P AXIS: 57 DEGREES
EKG P-R INTERVAL: 121 MS
EKG Q-T INTERVAL: 395 MS
EKG QRS DURATION: 97 MS
EKG QTC CALCULATION (BAZETT): 436 MS
EKG R AXIS: 57 DEGREES
EKG T AXIS: 63 DEGREES
EKG VENTRICULAR RATE: 73 BPM
EOSINOPHIL # BLD: 0.2 K/UL (ref 0–0.8)
EOSINOPHIL NFR BLD: 1 % (ref 0.5–7.8)
EPI CELLS #/AREA URNS HPF: ABNORMAL /HPF
ERYTHROCYTE [DISTWIDTH] IN BLOOD BY AUTOMATED COUNT: 13.2 % (ref 11.9–14.6)
GLOBULIN SER CALC-MCNC: 3.4 G/DL (ref 2.8–4.5)
GLUCOSE SERPL-MCNC: 163 MG/DL (ref 65–100)
GLUCOSE UR STRIP.AUTO-MCNC: NEGATIVE MG/DL
HCT VFR BLD AUTO: 36.5 % (ref 41.1–50.3)
HGB BLD-MCNC: 12.3 G/DL (ref 13.6–17.2)
HGB UR QL STRIP: ABNORMAL
IMM GRANULOCYTES # BLD AUTO: 0.3 K/UL (ref 0–0.5)
IMM GRANULOCYTES NFR BLD AUTO: 1 % (ref 0–5)
KETONES UR QL STRIP.AUTO: NEGATIVE MG/DL
LACTATE SERPL-SCNC: 1.5 MMOL/L (ref 0.4–2)
LACTATE SERPL-SCNC: 2.4 MMOL/L (ref 0.4–2)
LEUKOCYTE ESTERASE UR QL STRIP.AUTO: ABNORMAL
LYMPHOCYTES # BLD: 1.6 K/UL (ref 0.5–4.6)
LYMPHOCYTES NFR BLD: 8 % (ref 13–44)
MCH RBC QN AUTO: 30.5 PG (ref 26.1–32.9)
MCHC RBC AUTO-ENTMCNC: 33.7 G/DL (ref 31.4–35)
MCV RBC AUTO: 90.6 FL (ref 82–102)
MONOCYTES # BLD: 1.3 K/UL (ref 0.1–1.3)
MONOCYTES NFR BLD: 6 % (ref 4–12)
MUCOUS THREADS URNS QL MICRO: 0 /LPF
NEUTS SEG # BLD: 17.1 K/UL (ref 1.7–8.2)
NEUTS SEG NFR BLD: 84 % (ref 43–78)
NITRITE UR QL STRIP.AUTO: NEGATIVE
NRBC # BLD: 0 K/UL (ref 0–0.2)
OTHER OBSERVATIONS: ABNORMAL
PH UR STRIP: 6 (ref 5–9)
PLATELET # BLD AUTO: 278 K/UL (ref 150–450)
PMV BLD AUTO: 9.1 FL (ref 9.4–12.3)
POTASSIUM SERPL-SCNC: 3.6 MMOL/L (ref 3.5–5.1)
PROCALCITONIN SERPL-MCNC: <0.05 NG/ML (ref 0–0.49)
PROT SERPL-MCNC: 5.7 G/DL (ref 6.3–8.2)
PROT UR STRIP-MCNC: 100 MG/DL
RBC # BLD AUTO: 4.03 M/UL (ref 4.23–5.6)
RBC #/AREA URNS HPF: >100 /HPF
SODIUM SERPL-SCNC: 138 MMOL/L (ref 133–143)
SP GR UR REFRACTOMETRY: 1.02 (ref 1–1.02)
UROBILINOGEN UR QL STRIP.AUTO: 1 EU/DL (ref 0.2–1)
WBC # BLD AUTO: 20.4 K/UL (ref 4.3–11.1)
WBC URNS QL MICRO: >100 /HPF

## 2023-06-18 PROCEDURE — 2500000003 HC RX 250 WO HCPCS: Performed by: EMERGENCY MEDICINE

## 2023-06-18 PROCEDURE — 81001 URINALYSIS AUTO W/SCOPE: CPT

## 2023-06-18 PROCEDURE — 80053 COMPREHEN METABOLIC PANEL: CPT

## 2023-06-18 PROCEDURE — 96365 THER/PROPH/DIAG IV INF INIT: CPT

## 2023-06-18 PROCEDURE — 93010 ELECTROCARDIOGRAM REPORT: CPT | Performed by: INTERNAL MEDICINE

## 2023-06-18 PROCEDURE — 99285 EMERGENCY DEPT VISIT HI MDM: CPT

## 2023-06-18 PROCEDURE — 2580000003 HC RX 258: Performed by: EMERGENCY MEDICINE

## 2023-06-18 PROCEDURE — 87186 SC STD MICRODIL/AGAR DIL: CPT

## 2023-06-18 PROCEDURE — 2580000003 HC RX 258: Performed by: INTERNAL MEDICINE

## 2023-06-18 PROCEDURE — 6360000002 HC RX W HCPCS: Performed by: EMERGENCY MEDICINE

## 2023-06-18 PROCEDURE — 1100000000 HC RM PRIVATE

## 2023-06-18 PROCEDURE — 87086 URINE CULTURE/COLONY COUNT: CPT

## 2023-06-18 PROCEDURE — 87040 BLOOD CULTURE FOR BACTERIA: CPT

## 2023-06-18 PROCEDURE — 84145 PROCALCITONIN (PCT): CPT

## 2023-06-18 PROCEDURE — 96375 TX/PRO/DX INJ NEW DRUG ADDON: CPT

## 2023-06-18 PROCEDURE — 81015 MICROSCOPIC EXAM OF URINE: CPT

## 2023-06-18 PROCEDURE — 87088 URINE BACTERIA CULTURE: CPT

## 2023-06-18 PROCEDURE — 6370000000 HC RX 637 (ALT 250 FOR IP): Performed by: INTERNAL MEDICINE

## 2023-06-18 PROCEDURE — 93005 ELECTROCARDIOGRAM TRACING: CPT | Performed by: EMERGENCY MEDICINE

## 2023-06-18 PROCEDURE — 6360000002 HC RX W HCPCS: Performed by: INTERNAL MEDICINE

## 2023-06-18 PROCEDURE — 83605 ASSAY OF LACTIC ACID: CPT

## 2023-06-18 PROCEDURE — 85025 COMPLETE CBC W/AUTO DIFF WBC: CPT

## 2023-06-18 PROCEDURE — 76870 US EXAM SCROTUM: CPT

## 2023-06-18 PROCEDURE — 99221 1ST HOSP IP/OBS SF/LOW 40: CPT | Performed by: UROLOGY

## 2023-06-18 RX ORDER — HYDROMORPHONE HYDROCHLORIDE 1 MG/ML
0.5 INJECTION, SOLUTION INTRAMUSCULAR; INTRAVENOUS; SUBCUTANEOUS
Status: COMPLETED | OUTPATIENT
Start: 2023-06-18 | End: 2023-06-18

## 2023-06-18 RX ORDER — SODIUM CHLORIDE 9 MG/ML
INJECTION, SOLUTION INTRAVENOUS PRN
Status: DISCONTINUED | OUTPATIENT
Start: 2023-06-18 | End: 2023-06-21 | Stop reason: HOSPADM

## 2023-06-18 RX ORDER — POLYETHYLENE GLYCOL 3350 17 G/17G
17 POWDER, FOR SOLUTION ORAL DAILY PRN
Status: DISCONTINUED | OUTPATIENT
Start: 2023-06-18 | End: 2023-06-21 | Stop reason: HOSPADM

## 2023-06-18 RX ORDER — OXYCODONE HYDROCHLORIDE 5 MG/1
10 TABLET ORAL EVERY 4 HOURS PRN
Status: DISCONTINUED | OUTPATIENT
Start: 2023-06-18 | End: 2023-06-21 | Stop reason: HOSPADM

## 2023-06-18 RX ORDER — SODIUM CHLORIDE 9 MG/ML
INJECTION, SOLUTION INTRAVENOUS CONTINUOUS
Status: DISCONTINUED | OUTPATIENT
Start: 2023-06-18 | End: 2023-06-19

## 2023-06-18 RX ORDER — ACETAMINOPHEN 325 MG/1
650 TABLET ORAL EVERY 6 HOURS PRN
Status: DISCONTINUED | OUTPATIENT
Start: 2023-06-18 | End: 2023-06-21 | Stop reason: HOSPADM

## 2023-06-18 RX ORDER — ENOXAPARIN SODIUM 100 MG/ML
40 INJECTION SUBCUTANEOUS DAILY
Status: DISCONTINUED | OUTPATIENT
Start: 2023-06-18 | End: 2023-06-21 | Stop reason: HOSPADM

## 2023-06-18 RX ORDER — KETOROLAC TROMETHAMINE 15 MG/ML
15 INJECTION, SOLUTION INTRAMUSCULAR; INTRAVENOUS
Status: COMPLETED | OUTPATIENT
Start: 2023-06-18 | End: 2023-06-18

## 2023-06-18 RX ORDER — KETOROLAC TROMETHAMINE 15 MG/ML
15 INJECTION, SOLUTION INTRAMUSCULAR; INTRAVENOUS EVERY 6 HOURS
Status: DISCONTINUED | OUTPATIENT
Start: 2023-06-19 | End: 2023-06-21 | Stop reason: HOSPADM

## 2023-06-18 RX ORDER — LISINOPRIL 5 MG/1
5 TABLET ORAL DAILY
Status: DISCONTINUED | OUTPATIENT
Start: 2023-06-18 | End: 2023-06-21 | Stop reason: HOSPADM

## 2023-06-18 RX ORDER — ONDANSETRON 2 MG/ML
4 INJECTION INTRAMUSCULAR; INTRAVENOUS EVERY 6 HOURS PRN
Status: DISCONTINUED | OUTPATIENT
Start: 2023-06-18 | End: 2023-06-21 | Stop reason: HOSPADM

## 2023-06-18 RX ORDER — SODIUM CHLORIDE, SODIUM LACTATE, POTASSIUM CHLORIDE, CALCIUM CHLORIDE 600; 310; 30; 20 MG/100ML; MG/100ML; MG/100ML; MG/100ML
INJECTION, SOLUTION INTRAVENOUS CONTINUOUS
Status: DISCONTINUED | OUTPATIENT
Start: 2023-06-18 | End: 2023-06-18

## 2023-06-18 RX ORDER — ONDANSETRON 2 MG/ML
4 INJECTION INTRAMUSCULAR; INTRAVENOUS
Status: COMPLETED | OUTPATIENT
Start: 2023-06-18 | End: 2023-06-18

## 2023-06-18 RX ORDER — SODIUM CHLORIDE 0.9 % (FLUSH) 0.9 %
5-40 SYRINGE (ML) INJECTION PRN
Status: DISCONTINUED | OUTPATIENT
Start: 2023-06-18 | End: 2023-06-21 | Stop reason: HOSPADM

## 2023-06-18 RX ORDER — ATORVASTATIN CALCIUM 10 MG/1
10 TABLET, FILM COATED ORAL NIGHTLY
Status: DISCONTINUED | OUTPATIENT
Start: 2023-06-18 | End: 2023-06-21 | Stop reason: HOSPADM

## 2023-06-18 RX ORDER — 0.9 % SODIUM CHLORIDE 0.9 %
500 INTRAVENOUS SOLUTION INTRAVENOUS ONCE
Status: COMPLETED | OUTPATIENT
Start: 2023-06-18 | End: 2023-06-18

## 2023-06-18 RX ORDER — OXYCODONE HYDROCHLORIDE 5 MG/1
5 TABLET ORAL EVERY 4 HOURS PRN
Status: DISCONTINUED | OUTPATIENT
Start: 2023-06-18 | End: 2023-06-21 | Stop reason: HOSPADM

## 2023-06-18 RX ORDER — ONDANSETRON 4 MG/1
4 TABLET, ORALLY DISINTEGRATING ORAL EVERY 8 HOURS PRN
Status: DISCONTINUED | OUTPATIENT
Start: 2023-06-18 | End: 2023-06-21 | Stop reason: HOSPADM

## 2023-06-18 RX ORDER — SODIUM CHLORIDE 0.9 % (FLUSH) 0.9 %
5-40 SYRINGE (ML) INJECTION EVERY 12 HOURS SCHEDULED
Status: DISCONTINUED | OUTPATIENT
Start: 2023-06-18 | End: 2023-06-21 | Stop reason: HOSPADM

## 2023-06-18 RX ADMIN — ATORVASTATIN CALCIUM 10 MG: 10 TABLET, FILM COATED ORAL at 21:19

## 2023-06-18 RX ADMIN — SODIUM CHLORIDE, PRESERVATIVE FREE 10 ML: 5 INJECTION INTRAVENOUS at 21:19

## 2023-06-18 RX ADMIN — HYDROMORPHONE HYDROCHLORIDE 0.5 MG: 1 INJECTION, SOLUTION INTRAMUSCULAR; INTRAVENOUS; SUBCUTANEOUS at 16:16

## 2023-06-18 RX ADMIN — ONDANSETRON 4 MG: 2 INJECTION INTRAMUSCULAR; INTRAVENOUS at 16:16

## 2023-06-18 RX ADMIN — SODIUM CHLORIDE, POTASSIUM CHLORIDE, SODIUM LACTATE AND CALCIUM CHLORIDE: 600; 310; 30; 20 INJECTION, SOLUTION INTRAVENOUS at 16:17

## 2023-06-18 RX ADMIN — CEFTRIAXONE 1000 MG: 1 INJECTION, POWDER, FOR SOLUTION INTRAMUSCULAR; INTRAVENOUS at 16:24

## 2023-06-18 RX ADMIN — LISINOPRIL 5 MG: 5 TABLET ORAL at 21:19

## 2023-06-18 RX ADMIN — KETOROLAC TROMETHAMINE 15 MG: 15 INJECTION, SOLUTION INTRAMUSCULAR; INTRAVENOUS at 18:15

## 2023-06-18 RX ADMIN — ENOXAPARIN SODIUM 40 MG: 40 INJECTION SUBCUTANEOUS at 21:19

## 2023-06-18 RX ADMIN — SODIUM CHLORIDE: 9 INJECTION, SOLUTION INTRAVENOUS at 21:19

## 2023-06-18 RX ADMIN — SODIUM CHLORIDE 500 ML: 9 INJECTION, SOLUTION INTRAVENOUS at 17:41

## 2023-06-18 ASSESSMENT — PAIN DESCRIPTION - ORIENTATION: ORIENTATION: LEFT

## 2023-06-18 ASSESSMENT — ENCOUNTER SYMPTOMS
SHORTNESS OF BREATH: 0
RESPIRATORY NEGATIVE: 1
VOMITING: 0
COUGH: 0
BACK PAIN: 0
GASTROINTESTINAL NEGATIVE: 1
ABDOMINAL PAIN: 0
DIARRHEA: 0

## 2023-06-18 ASSESSMENT — PAIN SCALES - GENERAL
PAINLEVEL_OUTOF10: 7
PAINLEVEL_OUTOF10: 7
PAINLEVEL_OUTOF10: 8
PAINLEVEL_OUTOF10: 7
PAINLEVEL_OUTOF10: 9

## 2023-06-18 ASSESSMENT — PAIN DESCRIPTION - LOCATION
LOCATION: GROIN

## 2023-06-18 ASSESSMENT — PAIN DESCRIPTION - DESCRIPTORS: DESCRIPTORS: ACHING

## 2023-06-18 ASSESSMENT — PAIN - FUNCTIONAL ASSESSMENT
PAIN_FUNCTIONAL_ASSESSMENT: PREVENTS OR INTERFERES SOME ACTIVE ACTIVITIES AND ADLS
PAIN_FUNCTIONAL_ASSESSMENT: 0-10

## 2023-06-18 ASSESSMENT — PAIN DESCRIPTION - PAIN TYPE: TYPE: ACUTE PAIN

## 2023-06-18 ASSESSMENT — PAIN DESCRIPTION - ONSET: ONSET: ON-GOING

## 2023-06-18 ASSESSMENT — PAIN DESCRIPTION - FREQUENCY: FREQUENCY: CONTINUOUS

## 2023-06-18 ASSESSMENT — PAIN SCALES - WONG BAKER: WONGBAKER_NUMERICALRESPONSE: 0

## 2023-06-19 LAB
ANION GAP SERPL CALC-SCNC: 4 MMOL/L (ref 2–11)
BASOPHILS # BLD: 0.1 K/UL (ref 0–0.2)
BASOPHILS NFR BLD: 0 % (ref 0–2)
BUN SERPL-MCNC: 19 MG/DL (ref 8–23)
CALCIUM SERPL-MCNC: 7.7 MG/DL (ref 8.3–10.4)
CHLORIDE SERPL-SCNC: 113 MMOL/L (ref 101–110)
CO2 SERPL-SCNC: 25 MMOL/L (ref 21–32)
CREAT SERPL-MCNC: 0.9 MG/DL (ref 0.8–1.5)
DIFFERENTIAL METHOD BLD: ABNORMAL
EOSINOPHIL # BLD: 0.3 K/UL (ref 0–0.8)
EOSINOPHIL NFR BLD: 2 % (ref 0.5–7.8)
ERYTHROCYTE [DISTWIDTH] IN BLOOD BY AUTOMATED COUNT: 13.3 % (ref 11.9–14.6)
GLUCOSE SERPL-MCNC: 128 MG/DL (ref 65–100)
HCT VFR BLD AUTO: 35.3 % (ref 41.1–50.3)
HGB BLD-MCNC: 11.5 G/DL (ref 13.6–17.2)
IMM GRANULOCYTES # BLD AUTO: 0.4 K/UL (ref 0–0.5)
IMM GRANULOCYTES NFR BLD AUTO: 2 % (ref 0–5)
LYMPHOCYTES # BLD: 1.5 K/UL (ref 0.5–4.6)
LYMPHOCYTES NFR BLD: 10 % (ref 13–44)
MCH RBC QN AUTO: 30 PG (ref 26.1–32.9)
MCHC RBC AUTO-ENTMCNC: 32.6 G/DL (ref 31.4–35)
MCV RBC AUTO: 92.2 FL (ref 82–102)
MONOCYTES # BLD: 1 K/UL (ref 0.1–1.3)
MONOCYTES NFR BLD: 6 % (ref 4–12)
NEUTS SEG # BLD: 12.6 K/UL (ref 1.7–8.2)
NEUTS SEG NFR BLD: 80 % (ref 43–78)
NRBC # BLD: 0 K/UL (ref 0–0.2)
PLATELET # BLD AUTO: 240 K/UL (ref 150–450)
PMV BLD AUTO: 9.7 FL (ref 9.4–12.3)
POTASSIUM SERPL-SCNC: 3.9 MMOL/L (ref 3.5–5.1)
RBC # BLD AUTO: 3.83 M/UL (ref 4.23–5.6)
SODIUM SERPL-SCNC: 142 MMOL/L (ref 133–143)
WBC # BLD AUTO: 15.8 K/UL (ref 4.3–11.1)

## 2023-06-19 PROCEDURE — 1100000000 HC RM PRIVATE

## 2023-06-19 PROCEDURE — 36415 COLL VENOUS BLD VENIPUNCTURE: CPT

## 2023-06-19 PROCEDURE — 2580000003 HC RX 258: Performed by: INTERNAL MEDICINE

## 2023-06-19 PROCEDURE — 6370000000 HC RX 637 (ALT 250 FOR IP): Performed by: INTERNAL MEDICINE

## 2023-06-19 PROCEDURE — 85025 COMPLETE CBC W/AUTO DIFF WBC: CPT

## 2023-06-19 PROCEDURE — 80048 BASIC METABOLIC PNL TOTAL CA: CPT

## 2023-06-19 PROCEDURE — 6360000002 HC RX W HCPCS: Performed by: INTERNAL MEDICINE

## 2023-06-19 RX ORDER — NICOTINE 21 MG/24HR
1 PATCH, TRANSDERMAL 24 HOURS TRANSDERMAL DAILY PRN
Status: DISCONTINUED | OUTPATIENT
Start: 2023-06-19 | End: 2023-06-21 | Stop reason: HOSPADM

## 2023-06-19 RX ADMIN — OXYCODONE HYDROCHLORIDE 5 MG: 5 TABLET ORAL at 20:39

## 2023-06-19 RX ADMIN — KETOROLAC TROMETHAMINE 15 MG: 15 INJECTION, SOLUTION INTRAMUSCULAR; INTRAVENOUS at 11:42

## 2023-06-19 RX ADMIN — LISINOPRIL 5 MG: 5 TABLET ORAL at 08:34

## 2023-06-19 RX ADMIN — KETOROLAC TROMETHAMINE 15 MG: 15 INJECTION, SOLUTION INTRAMUSCULAR; INTRAVENOUS at 00:19

## 2023-06-19 RX ADMIN — SODIUM CHLORIDE, PRESERVATIVE FREE 10 ML: 5 INJECTION INTRAVENOUS at 20:42

## 2023-06-19 RX ADMIN — SODIUM CHLORIDE: 9 INJECTION, SOLUTION INTRAVENOUS at 06:38

## 2023-06-19 RX ADMIN — OXYCODONE HYDROCHLORIDE 5 MG: 5 TABLET ORAL at 08:34

## 2023-06-19 RX ADMIN — SODIUM CHLORIDE, PRESERVATIVE FREE 10 ML: 5 INJECTION INTRAVENOUS at 08:34

## 2023-06-19 RX ADMIN — ENOXAPARIN SODIUM 40 MG: 40 INJECTION SUBCUTANEOUS at 20:39

## 2023-06-19 RX ADMIN — ATORVASTATIN CALCIUM 10 MG: 10 TABLET, FILM COATED ORAL at 20:39

## 2023-06-19 RX ADMIN — KETOROLAC TROMETHAMINE 15 MG: 15 INJECTION, SOLUTION INTRAMUSCULAR; INTRAVENOUS at 17:39

## 2023-06-19 RX ADMIN — KETOROLAC TROMETHAMINE 15 MG: 15 INJECTION, SOLUTION INTRAMUSCULAR; INTRAVENOUS at 05:28

## 2023-06-19 ASSESSMENT — PAIN DESCRIPTION - DESCRIPTORS
DESCRIPTORS: ACHING
DESCRIPTORS: ACHING
DESCRIPTORS: ACHING;SORE
DESCRIPTORS: ACHING;SORE
DESCRIPTORS: ACHING

## 2023-06-19 ASSESSMENT — PAIN - FUNCTIONAL ASSESSMENT
PAIN_FUNCTIONAL_ASSESSMENT: PREVENTS OR INTERFERES SOME ACTIVE ACTIVITIES AND ADLS
PAIN_FUNCTIONAL_ASSESSMENT: PREVENTS OR INTERFERES SOME ACTIVE ACTIVITIES AND ADLS
PAIN_FUNCTIONAL_ASSESSMENT: ACTIVITIES ARE NOT PREVENTED

## 2023-06-19 ASSESSMENT — PAIN DESCRIPTION - LOCATION
LOCATION: SCROTUM
LOCATION: SCROTUM
LOCATION: GROIN
LOCATION: GROIN
LOCATION: SCROTUM

## 2023-06-19 ASSESSMENT — PAIN DESCRIPTION - ORIENTATION
ORIENTATION: RIGHT;LEFT
ORIENTATION: LEFT
ORIENTATION: LEFT;RIGHT
ORIENTATION: LEFT

## 2023-06-19 ASSESSMENT — PAIN DESCRIPTION - ONSET: ONSET: GRADUAL

## 2023-06-19 ASSESSMENT — PAIN SCALES - GENERAL
PAINLEVEL_OUTOF10: 7
PAINLEVEL_OUTOF10: 5
PAINLEVEL_OUTOF10: 0
PAINLEVEL_OUTOF10: 7
PAINLEVEL_OUTOF10: 6
PAINLEVEL_OUTOF10: 5

## 2023-06-19 ASSESSMENT — PAIN SCALES - WONG BAKER
WONGBAKER_NUMERICALRESPONSE: 0
WONGBAKER_NUMERICALRESPONSE: 0

## 2023-06-19 ASSESSMENT — PAIN DESCRIPTION - FREQUENCY: FREQUENCY: INTERMITTENT

## 2023-06-19 ASSESSMENT — PAIN DESCRIPTION - PAIN TYPE
TYPE: ACUTE PAIN
TYPE: ACUTE PAIN

## 2023-06-19 NOTE — CARE COORDINATION
RNCM met with patient in room 210 to discuss discharge planning. Patient readmitted 6/18 after being discharged on 6/10 with urinary retention. Patient readmitted with prostate pain. Patient s/p turp during previous admission. Patient currently receiving IV abx. Patient is currently homeless. PT/OT consulted. Awaiting recommendations. CM following.         06/19/23 1430   Service Assessment   Patient Orientation Alert and Oriented   Cognition Alert   History Provided By Patient   Primary [address] [facility]  None   Patient's Healthcare Decision Maker is: Legal Next of Kin   PCP Verified by CM Yes   Last Visit to PCP Within last year  [facility]   Prior Functional Level Independent in ADLs/IADLs   Current Functional Level Independent in ADLs/IADLs   Can patient return to prior living arrangement Yes   Ability to make needs known: Good   Family able to assist with home care needs: Yes   Would you like for me to discuss the discharge plan with any other family members/significant others, and if so, who? No   Financial Resources Medicaid; Medicare   Community Resources None   Social/Functional History   Lives With Other (comment)  (Homeless)   Home Equipment None   ADL Assistance Independent   Ambulation Assistance Independent   Transfer Assistance Independent   Active  Yes   Occupation Retired   Discharge Planning   Type of [address] [place] [address] Prior To Admission [address] [facility]   DME Ordered? No   Potential Assistance Purchasing Medications No   Type of [facility]   Patient expects to be discharged to: Unknown   One/Two Story Residence One story   History of falls?  1

## 2023-06-19 NOTE — PLAN OF CARE
Problem: Discharge Planning  Goal: Discharge to home or other facility with appropriate resources  6/19/2023 0943 by Abhi Hodges RN  Outcome: Progressing  6/18/2023 2347 by Vianey Mahre RN  Outcome: Progressing  Flowsheets  Taken 6/18/2023 2038  Discharge to home or other facility with appropriate resources: Identify barriers to discharge with patient and caregiver  Taken 6/18/2023 2030  Discharge to home or other facility with appropriate resources: Identify barriers to discharge with patient and caregiver     Problem: Pain  Goal: Verbalizes/displays adequate comfort level or baseline comfort level  6/19/2023 0943 by Abhi Hodges RN  Outcome: Progressing  6/18/2023 2347 by Vianey Maher RN  Outcome: Progressing  Flowsheets (Taken 6/18/2023 2030)  Verbalizes/displays adequate comfort level or baseline comfort level:   Encourage patient to monitor pain and request assistance   Assess pain using appropriate pain scale   Administer analgesics based on type and severity of pain and evaluate response   Implement non-pharmacological measures as appropriate and evaluate response   Consider cultural and social influences on pain and pain management     Problem: Safety - Adult  Goal: Free from fall injury  6/19/2023 0943 by Abhi Hodges RN  Outcome: Progressing  Flowsheets (Taken 6/19/2023 0725)  Free From Fall Injury: Instruct family/caregiver on patient safety  6/18/2023 2347 by Vianey Maher RN  Outcome: Progressing

## 2023-06-20 LAB
ANION GAP SERPL CALC-SCNC: 4 MMOL/L (ref 2–11)
BASOPHILS # BLD: 0.1 K/UL (ref 0–0.2)
BASOPHILS NFR BLD: 1 % (ref 0–2)
BUN SERPL-MCNC: 19 MG/DL (ref 8–23)
CALCIUM SERPL-MCNC: 8 MG/DL (ref 8.3–10.4)
CHLORIDE SERPL-SCNC: 112 MMOL/L (ref 101–110)
CO2 SERPL-SCNC: 24 MMOL/L (ref 21–32)
CREAT SERPL-MCNC: 1 MG/DL (ref 0.8–1.5)
DIFFERENTIAL METHOD BLD: ABNORMAL
EOSINOPHIL # BLD: 0.4 K/UL (ref 0–0.8)
EOSINOPHIL NFR BLD: 4 % (ref 0.5–7.8)
ERYTHROCYTE [DISTWIDTH] IN BLOOD BY AUTOMATED COUNT: 13.4 % (ref 11.9–14.6)
GLUCOSE SERPL-MCNC: 100 MG/DL (ref 65–100)
HCT VFR BLD AUTO: 35.8 % (ref 41.1–50.3)
HGB BLD-MCNC: 11.6 G/DL (ref 13.6–17.2)
IMM GRANULOCYTES # BLD AUTO: 0.4 K/UL (ref 0–0.5)
IMM GRANULOCYTES NFR BLD AUTO: 4 % (ref 0–5)
LYMPHOCYTES # BLD: 1.2 K/UL (ref 0.5–4.6)
LYMPHOCYTES NFR BLD: 13 % (ref 13–44)
MCH RBC QN AUTO: 29.7 PG (ref 26.1–32.9)
MCHC RBC AUTO-ENTMCNC: 32.4 G/DL (ref 31.4–35)
MCV RBC AUTO: 91.8 FL (ref 82–102)
MONOCYTES # BLD: 0.7 K/UL (ref 0.1–1.3)
MONOCYTES NFR BLD: 7 % (ref 4–12)
NEUTS SEG # BLD: 7 K/UL (ref 1.7–8.2)
NEUTS SEG NFR BLD: 71 % (ref 43–78)
NRBC # BLD: 0 K/UL (ref 0–0.2)
PLATELET # BLD AUTO: 264 K/UL (ref 150–450)
PMV BLD AUTO: 9.3 FL (ref 9.4–12.3)
POTASSIUM SERPL-SCNC: 4 MMOL/L (ref 3.5–5.1)
RBC # BLD AUTO: 3.9 M/UL (ref 4.23–5.6)
SODIUM SERPL-SCNC: 140 MMOL/L (ref 133–143)
WBC # BLD AUTO: 9.8 K/UL (ref 4.3–11.1)

## 2023-06-20 PROCEDURE — 6360000002 HC RX W HCPCS: Performed by: INTERNAL MEDICINE

## 2023-06-20 PROCEDURE — 80048 BASIC METABOLIC PNL TOTAL CA: CPT

## 2023-06-20 PROCEDURE — 97116 GAIT TRAINING THERAPY: CPT

## 2023-06-20 PROCEDURE — 97161 PT EVAL LOW COMPLEX 20 MIN: CPT

## 2023-06-20 PROCEDURE — 99232 SBSQ HOSP IP/OBS MODERATE 35: CPT | Performed by: UROLOGY

## 2023-06-20 PROCEDURE — 6370000000 HC RX 637 (ALT 250 FOR IP): Performed by: INTERNAL MEDICINE

## 2023-06-20 PROCEDURE — 97165 OT EVAL LOW COMPLEX 30 MIN: CPT

## 2023-06-20 PROCEDURE — 97535 SELF CARE MNGMENT TRAINING: CPT

## 2023-06-20 PROCEDURE — 85025 COMPLETE CBC W/AUTO DIFF WBC: CPT

## 2023-06-20 PROCEDURE — 1100000000 HC RM PRIVATE

## 2023-06-20 PROCEDURE — 2580000003 HC RX 258: Performed by: INTERNAL MEDICINE

## 2023-06-20 PROCEDURE — 36415 COLL VENOUS BLD VENIPUNCTURE: CPT

## 2023-06-20 RX ADMIN — ATORVASTATIN CALCIUM 10 MG: 10 TABLET, FILM COATED ORAL at 20:37

## 2023-06-20 RX ADMIN — POLYETHYLENE GLYCOL 3350 17 G: 17 POWDER, FOR SOLUTION ORAL at 11:48

## 2023-06-20 RX ADMIN — KETOROLAC TROMETHAMINE 15 MG: 15 INJECTION, SOLUTION INTRAMUSCULAR; INTRAVENOUS at 06:12

## 2023-06-20 RX ADMIN — SODIUM CHLORIDE, PRESERVATIVE FREE 10 ML: 5 INJECTION INTRAVENOUS at 20:37

## 2023-06-20 RX ADMIN — KETOROLAC TROMETHAMINE 15 MG: 15 INJECTION, SOLUTION INTRAMUSCULAR; INTRAVENOUS at 00:31

## 2023-06-20 RX ADMIN — ENOXAPARIN SODIUM 40 MG: 40 INJECTION SUBCUTANEOUS at 20:37

## 2023-06-20 RX ADMIN — KETOROLAC TROMETHAMINE 15 MG: 15 INJECTION, SOLUTION INTRAMUSCULAR; INTRAVENOUS at 17:54

## 2023-06-20 RX ADMIN — OXYCODONE HYDROCHLORIDE 5 MG: 5 TABLET ORAL at 19:38

## 2023-06-20 RX ADMIN — LISINOPRIL 5 MG: 5 TABLET ORAL at 08:17

## 2023-06-20 RX ADMIN — KETOROLAC TROMETHAMINE 15 MG: 15 INJECTION, SOLUTION INTRAMUSCULAR; INTRAVENOUS at 11:28

## 2023-06-20 ASSESSMENT — PAIN DESCRIPTION - ORIENTATION
ORIENTATION: LEFT

## 2023-06-20 ASSESSMENT — PAIN DESCRIPTION - LOCATION
LOCATION: SCROTUM

## 2023-06-20 ASSESSMENT — PAIN SCALES - GENERAL
PAINLEVEL_OUTOF10: 0
PAINLEVEL_OUTOF10: 5
PAINLEVEL_OUTOF10: 3
PAINLEVEL_OUTOF10: 2

## 2023-06-20 ASSESSMENT — PAIN DESCRIPTION - DESCRIPTORS
DESCRIPTORS: ACHING
DESCRIPTORS: ACHING

## 2023-06-20 NOTE — PROGRESS NOTES
ACUTE OCCUPATIONAL THERAPY GOALS:   (Developed with and agreed upon by patient and/or caregiver.)  1. Pt will complete toileting supervision- I.   2. Pt will complete UB ADL set up only. 3. Pt will tolerate at least 8 minutes of OT treatment requiring 1-2 breaks as needed. 4. Pt will complete self feeding set up only. 5. Pt will complete functional mobility SBA- CGA  6. Pt will tolerate BUE exercises to increase strength for safe, functional transfers, ADL participation. ALL GOALS MET 6/20/23    OCCUPATIONAL THERAPY Initial Assessment, Daily Note, Discharge, and AM       OT Visit Days: 1  Acknowledge Orders  Time  OT Charge Capture  Rehab Caseload Tracker      Jacinta Pretty is a 72 y.o. male   PRIMARY DIAGNOSIS: Acute epididymitis  UTI (urinary tract infection) [N39.0]  Acute epididymitis [N45.1]  Left testicular pain [N50.812]       Reason for Referral: Generalized Muscle Weakness (M62.81)  Inpatient: Payor: Traci Somers / Plan: [employer] / Product Type: *No Product type* /     ASSESSMENT:     REHAB RECOMMENDATIONS:   Recommendation to date pending progress:  Setting:  No further skilled occupational therapy after discharge from hospital    Equipment:    None     ASSESSMENT:  Mr. Alyssa Muniz is a 72 y M with a hx of TURP 6/10/23. Pt was admitted for acute epididymitis. Pt admitted with L testicle pain/swelling. Pt was received standing at toilet urinating. Pt states he has some pain with urination Pt required assistance for functional mobility and ADLs today due to testicle pain, slight unsteadiness, impulsivity. Pt is not appropriate for skilled acute OT services at this time. OT will DC. PT will continue to see pt.      [employer] AM-PAC 6 Clicks Daily Activity Inpatient Short Form:    AM-PAC Daily Activity - Inpatient   How much help is needed for putting on and taking off regular lower body clothing?: None  How much help is needed for bathing (which includes washing, rinsing,
ACUTE PHYSICAL THERAPY GOALS:   (Developed with and agreed upon by patient and/or caregiver. )  LTG:  (1.)Mr. Erwin Mcdermott will move from supine to sit and sit to supine  in bed with INDEPENDENCE within 7 treatment day(s). (2.)Mr. Erwin Mcdermott will transfer from bed to chair and chair to bed with INDEPENDENCE using the least restrictive device within 7 treatment day(s). (3.)Mr. Eriwn Mcdermott will ambulate with MODIFIED INDEPENDENCE for 600 feet with the least restrictive device within 7 treatment day(s). (4.)Mr. Erwin Mcdermott will perform standing static and dynamic balance activities x 23 minutes with SUPERVISION to improve safety within 7 treatment day(s). ________________________________________________________________________________________________      PHYSICAL THERAPY Initial Assessment, Daily Note, and AM  (Link to Caseload Tracking: PT Visit Days : 1  Acknowledge Orders  Time In/Out  PT Charge Capture  Rehab Caseload Tracker    Veronica Gómez is a 72 y.o. male   PRIMARY DIAGNOSIS: Acute epididymitis  UTI (urinary tract infection) [N39.0]  Acute epididymitis [N45.1]  Left testicular pain [N50.812]       Reason for Referral: Generalized Muscle Weakness (M62.81)  Other abnormalities of gait and mobility (R26.89)  Inpatient: Payor: Yola Rodriguez / Plan: [facility] HMO / Product Type: *No Product type* /     ASSESSMENT:     REHAB RECOMMENDATIONS:   Recommendation to date pending progress:  Setting:  No further skilled physical therapy after discharge from hospital    Equipment:    To Be Determined     ASSESSMENT:  Mr. Erwin Mcdermott is a 72year old M who presents to hospital with c/o groin swelling and pain and admitted for above diagnosis. Pt has PMH of polysubstance abuse, alcohol abuse and homelessness.  This date pt performs mobility including supine<>Sit with supervision and gait with SBA/CGA, gait belt and no AD, but encouraged to utilize hand rail on wall after one LOB that PT assisted with recovery of dynamic
END OF SHIFT NOTE:    INTAKE/OUTPUT  06/18 0701 - 06/19 0700  In: 767.7 [I.V.:767.7]  Out: 800 [Urine:800]  Voiding: Yes  Catheter: No          Flatus: Patient does have flatus present. Stool:  0 occurrences. Emesis: 0 occurrences. VITAL SIGNS  Patient Vitals for the past 12 hrs:   Temp Pulse Resp BP SpO2   06/19/23 0211 98.2 °F (36.8 °C) 61 17 117/82 96 %   06/18/23 2306 98.6 °F (37 °C) 71 17 138/86 97 %   06/18/23 2030 -- 63 -- 114/78 95 %   06/18/23 2009 97.7 °F (36.5 °C) 63 17 114/78 95 %   06/18/23 1830 -- 73 18 127/70 98 %   06/18/23 1815 -- 80 18 (!) 143/65 100 %       Ambulating  Yes    Shift report given to oncoming nurse at the bedside.     Iram Elliott RN
END OF SHIFT NOTE:    INTAKE/OUTPUT  06/18 0701 - 06/19 0700  In: 767.7 [I.V.:767.7]  Out: 800 [Urine:800]  Voiding: Yes  Catheter: No  Drain:              Flatus: Patient does have flatus present. Stool: 0 occurrences. Characteristics:           Stool Assessment  Incontinence: No    Emesis: 0 occurrences. Characteristics:        VITAL SIGNS  Patient Vitals for the past 12 hrs:   Temp Pulse Resp BP SpO2   06/19/23 1134 97.9 °F (36.6 °C) 70 18 (!) 101/59 96 %   06/19/23 0904 -- -- 17 -- --   06/19/23 0830 -- 65 -- 117/74 --   06/19/23 0748 98.4 °F (36.9 °C) 65 16 108/73 93 %       Pain Assessment  Pain Level: 0 (06/19/23 0904)  Pain Location: Scrotum  Patient's Stated Pain Goal: 0 - No pain    Ambulating  Yes    Shift report given to oncoming nurse at the bedside.     Camila Jung RN
END OF SHIFT NOTE:    INTAKE/OUTPUT  06/19 0701 - 06/20 0700  In: 660 [P.O.:660]  Out: 950 [Urine:950]  Voiding: Yes  Catheter: No  Drain:              Flatus: Patient does have flatus present. Stool:  occurrences. Characteristics:           Stool Assessment  Incontinence: No    Emesis:  occurrences. Characteristics:        VITAL SIGNS  Patient Vitals for the past 12 hrs:   Temp Pulse Resp BP SpO2   06/20/23 0352 97.8 °F (36.6 °C) 72 18 122/88 95 %   06/19/23 2245 97.8 °F (36.6 °C) 66 18 116/77 96 %   06/19/23 1947 97.9 °F (36.6 °C) 74 18 129/87 96 %       Pain Assessment  Pain Level: 2 (06/20/23 0612)  Pain Location: Scrotum  Patient's Stated Pain Goal: 2    Ambulating  Yes    Shift report given to oncoming nurse at the bedside.     Sherry Coughlin RN
END OF SHIFT NOTE:    INTAKE/OUTPUT  06/19 0701 - 06/20 0700  In: 660 [P.O.:660]  Out: 950 [Urine:950]  Voiding: Yes  Catheter: No  Drain:              Flatus: Patient does have flatus present. Stool: 0  occurrences. Characteristics:           Stool Assessment  Incontinence: No    Emesis:  0 occurrences. Characteristics:        VITAL SIGNS  Patient Vitals for the past 12 hrs:   Temp Pulse Resp BP SpO2   06/20/23 0733 97.9 °F (36.6 °C) 73 18 (!) 143/97 98 %       Pain Assessment  Pain Level: 0 (06/20/23 0715)  Pain Location: Scrotum  Patient's Stated Pain Goal: 2    Ambulating  Yes    Shift report given to oncoming nurse at the bedside.     Pito Underwood RN
Hospitalist Progress Note   Admit Date:  2023  3:13 PM   Name:  Milagros Coy   Age:  72 y.o. Sex:  male  :  1950   MRN:  [id number]   Room:      Presenting/Chief Complaint: Groin Swelling     Reason(s) for Admission: UTI (urinary tract infection) [N39.0]  Acute epididymitis [N45.1]  Left testicular pain [N50.812]     Hospital Course:   Mr. Maria Ines Boyce is a 71 y/o male with a h/o polysubstance abuse, alcohol abuse, BPH and bladder diverticulum s/p TURP 6/10 who was admitted to our service on  with L sided epididymitis. He presented with L testicular swelling and pain for about 1-2 days. WBCs 20k, procalcitonin neg. US showed decreased vascularity on the L suggesting torsion along with an edematous and hyperemic L epididymis. He was seen by Urology and felt he does not have testicular torsion. He was started on ceftriaxone and admitted for further management. Subjective & 24hr Events:   GNRs in culture, spoke to micro will result tomorrow. Still has pain but this is improved as is swelling. Ambulating w/o difficulty. WBCs normalized. Assessment & Plan:   # L sided epididymitis // acute cystitis              - S/p TURP on 6/10. Con't ceftriaxone. WBCs normal . Urology have s/o. Con't supportive care. Urine culture with GNRs, spoke to micro and this will finalize tomorrow. - Transition to orals  and discharge. Treat 2 weeks total.     # HTN              - Lisinopril     # HLD              - Statin     # H/o prostate cancer // BPH // bladder diverticulum              - S/p TURP on 6/10. # Polysubstance abuse              - UDS + amphetamines      PT/OT evals and PPD needed/ordered? Yes  Diet:  ADULT DIET;  Regular  VTE prophylaxis: Lovenox  Code status: Full Code    Hospital Problems:  Principal Problem:    Acute epididymitis  Active Problems:    BPH with urinary obstruction    Essential hypertension    Overweight with body mass index (BMI) 25.0-29.9    Restless
Hospitalist Progress Note   Admit Date:  2023  3:13 PM   Name:  Stephanie Deluna   Age:  72 y.o. Sex:  male  :  1950   MRN:  [id number]   Room:      Presenting/Chief Complaint: Groin Swelling     Reason(s) for Admission: UTI (urinary tract infection) [N39.0]  Acute epididymitis [N45.1]  Left testicular pain [N50.812]     Hospital Course:   Mr. Andrew Mcneil is a 71 y/o male with a h/o polysubstance abuse, alcohol abuse, BPH and bladder diverticulum s/p TURP 6/10 who was admitted to our service on  with L sided epididymitis. He presented with L testicular swelling and pain for about 1-2 days. WBCs 20k, procalcitonin neg. US showed decreased vascularity on the L suggesting torsion along with an edematous and hyperemic L epididymis. He was seen by Urology and felt he does not have testicular torsion. He was started on ceftriaxone and admitted for further management. Subjective & 24hr Events:   Pain better but still fairly significant. WBCs better. Ate breakfast w/o issues. Assessment & Plan:   # L sided epididymitis // acute cystitis   - S/p TURP on 6/10. Con't ceftriaxone. WBCs better. Urology following. Con't supportive care. Blood and urine culture from  neg so far. # HTN   - Lisinopril    # HLD   - Statin    # H/o prostate cancer // BPH // bladder diverticulum   - S/p TURP on 6/10. # Polysubstance abuse   - UDS + amphetamines    Diet:  ADULT DIET;  Regular  VTE prophylaxis: Lovenox  Code status: Full Code    Hospital Problems:  Principal Problem:    Acute epididymitis  Active Problems:    BPH with urinary obstruction    Essential hypertension    Overweight with body mass index (BMI) 25.0-29.9    Restless leg syndrome    Homeless    History of alcohol abuse    Methamphetamine abuse (HCC)    Polysubstance abuse (HCC)    Bladder diverticulum    Bladder neck obstruction    History of prostate cancer    H/O transurethral resection of prostate    Leukocytosis    Lactic
Physician Progress Note      PATIENT:               Izabela Hunter  CSN #:                  [id number]  :                       1950  ADMIT DATE:       2023 3:13 PM  [address] [place] DATE:    PROVIDER #:        Ellen Luz MD          QUERY TEXT:    Pt admitted with acute epididymitis and underwent a TURP on 6/10/2023, noted   documentation of UTI. ? If possible, please document in progress notes and   discharge summary the relationship if any between the UTI and the surgery: The medical record reflects the following:  Risk Factors: 72 yr, BPH, prostate cancer, s/p TURP on 6/10  Clinical Indicators: UA shows Urinalysis positive for blood, large leukocyte   Esterase, +4 bacteria, urine cultures with GNR  Treatment: IV antibiotics, Urology consult  Options provided:  -- UTI is due to the procedure  -- UTI is not due to the procedure, but is due to BPH with urinary obstruction  -- UTI is not due to the procedure, but is due to other incidental risk   factor, Please specify other incidental risk factor. -- Other - I will add my own diagnosis  -- Disagree - Not applicable / Not valid  -- Disagree - Clinically unable to determine / Unknown  -- Refer to Clinical Documentation Reviewer    PROVIDER RESPONSE TEXT:    Patient has UTI due to the procedure. Query created by: Lawson Chen on 2023 11:24 AM      Electronically signed by:   Ellen Luz MD 2023 11:29 AM
0.4 0.0 - 0.8 K/UL    Basophils Absolute 0.1 0.0 - 0.2 K/UL    Absolute Immature Granulocyte 0.4 0.0 - 0.5 K/UL   Basic Metabolic Panel w/ Reflex to MG    Collection Time: 06/20/23  4:54 AM   Result Value Ref Range    Sodium 140 133 - 143 mmol/L    Potassium 4.0 3.5 - 5.1 mmol/L    Chloride 112 (H) 101 - 110 mmol/L    CO2 24 21 - 32 mmol/L    Anion Gap 4 2 - 11 mmol/L    Glucose 100 65 - 100 mg/dL    BUN 19 8 - 23 MG/DL    Creatinine 1.00 0.8 - 1.5 MG/DL    Est, Glom Filt Rate >60 >60 ml/min/1.73m2    Calcium 8.0 (L) 8.3 - 10.4 MG/DL           Assessment:     Principal Problem:    Acute epididymitis  Active Problems:    BPH with urinary obstruction    Essential hypertension    Overweight with body mass index (BMI) 25.0-29.9    Restless leg syndrome    Homeless    History of alcohol abuse    Methamphetamine abuse (HCC)    Polysubstance abuse (HCC)    Bladder diverticulum    Bladder neck obstruction    History of prostate cancer    H/O transurethral resection of prostate    Leukocytosis    Lactic acidosis    UTI (urinary tract infection)  Resolved Problems:    * No resolved hospital problems. *    81 year old male with BPH/retention s/p TURP 6/10/23 who left AMA post-op now with acute epididymitis of L testicle on IV rocephin. Improving. Plan:     -Follow up cultures  -Continue antibiotics per primary team and narrow based on culture. Would recommend 2 week total course of PO antibiotic once culture returns  -Continue to allow spontaneous voiding. Doing well s/p TURP  -Dispo: Will sign off and arrange follow up in Urology clinic. Office will call to schedule. Call with questions. Fredrick Reddy M.D.     [facility]  [facility] [address]  [place], [address]  Phone: [phone]  Fax: [phone]

## 2023-06-21 VITALS
HEART RATE: 65 BPM | WEIGHT: 164 LBS | DIASTOLIC BLOOD PRESSURE: 97 MMHG | RESPIRATION RATE: 18 BRPM | SYSTOLIC BLOOD PRESSURE: 146 MMHG | OXYGEN SATURATION: 96 % | BODY MASS INDEX: 25.74 KG/M2 | TEMPERATURE: 98.4 F | HEIGHT: 67 IN

## 2023-06-21 PROBLEM — E87.20 LACTIC ACIDOSIS: Status: RESOLVED | Noted: 2023-06-18 | Resolved: 2023-06-21

## 2023-06-21 PROBLEM — D72.829 LEUKOCYTOSIS: Status: RESOLVED | Noted: 2023-06-18 | Resolved: 2023-06-21

## 2023-06-21 LAB
BACTERIA SPEC CULT: ABNORMAL
SERVICE CMNT-IMP: ABNORMAL

## 2023-06-21 PROCEDURE — 6360000002 HC RX W HCPCS: Performed by: INTERNAL MEDICINE

## 2023-06-21 PROCEDURE — 2580000003 HC RX 258: Performed by: INTERNAL MEDICINE

## 2023-06-21 PROCEDURE — 6370000000 HC RX 637 (ALT 250 FOR IP): Performed by: INTERNAL MEDICINE

## 2023-06-21 RX ORDER — HYDROCODONE BITARTRATE AND ACETAMINOPHEN 5; 325 MG/1; MG/1
1 TABLET ORAL EVERY 6 HOURS PRN
Qty: 12 TABLET | Refills: 0 | Status: SHIPPED | OUTPATIENT
Start: 2023-06-21 | End: 2023-06-24

## 2023-06-21 RX ORDER — SULFAMETHOXAZOLE AND TRIMETHOPRIM 800; 160 MG/1; MG/1
1 TABLET ORAL 2 TIMES DAILY
Qty: 22 TABLET | Refills: 0 | Status: SHIPPED | OUTPATIENT
Start: 2023-06-21 | End: 2023-07-02

## 2023-06-21 RX ADMIN — OXYCODONE HYDROCHLORIDE 5 MG: 5 TABLET ORAL at 09:10

## 2023-06-21 RX ADMIN — KETOROLAC TROMETHAMINE 15 MG: 15 INJECTION, SOLUTION INTRAMUSCULAR; INTRAVENOUS at 12:27

## 2023-06-21 RX ADMIN — SODIUM CHLORIDE, PRESERVATIVE FREE 10 ML: 5 INJECTION INTRAVENOUS at 09:11

## 2023-06-21 RX ADMIN — KETOROLAC TROMETHAMINE 15 MG: 15 INJECTION, SOLUTION INTRAMUSCULAR; INTRAVENOUS at 00:26

## 2023-06-21 RX ADMIN — LISINOPRIL 5 MG: 5 TABLET ORAL at 09:06

## 2023-06-21 RX ADMIN — KETOROLAC TROMETHAMINE 15 MG: 15 INJECTION, SOLUTION INTRAMUSCULAR; INTRAVENOUS at 06:30

## 2023-06-21 ASSESSMENT — PAIN DESCRIPTION - ORIENTATION
ORIENTATION: LEFT
ORIENTATION: RIGHT

## 2023-06-21 ASSESSMENT — PAIN DESCRIPTION - DESCRIPTORS
DESCRIPTORS: SORE
DESCRIPTORS: ACHING
DESCRIPTORS: ACHING

## 2023-06-21 ASSESSMENT — PAIN SCALES - GENERAL
PAINLEVEL_OUTOF10: 2
PAINLEVEL_OUTOF10: 6
PAINLEVEL_OUTOF10: 3

## 2023-06-21 ASSESSMENT — PAIN DESCRIPTION - LOCATION
LOCATION: OTHER (COMMENT)
LOCATION: OTHER (COMMENT)
LOCATION: SCROTUM

## 2023-06-21 ASSESSMENT — PAIN SCALES - WONG BAKER: WONGBAKER_NUMERICALRESPONSE: 0

## 2023-06-21 NOTE — DISCHARGE SUMMARY
Oximetry: 73 beats per minute  O2 Device: None (Room air)    Estimated body mass index is 25.69 kg/m² as calculated from the following:    Height as of this encounter: 5' 7\" (1.702 m). Weight as of this encounter: 164 lb (74.4 kg). No intake or output data in the 24 hours ending 06/21/23 1141      Physical Exam:  General:    Well nourished. No overt distress  Head:  Normocephalic, atraumatic  Eyes:  Sclerae appear normal.  Pupils equally round. HENT:  Nares appear normal, no drainage. Moist mucous membranes  Neck:  No restricted ROM. Trachea midline  CV:   RRR. No m/r/g. No JVD  Lungs:   CTAB. No wheezing, rhonchi, or rales. Respirations even, unlabored  Abdomen:   Soft, nontender, nondistended. :  Improved testicular tenderness on palpation. No significant scrotal edema, rashes or penile discharge. Extremities: Warm and dry. No cyanosis or clubbing. No edema. Skin:     No rashes. Normal coloration  Neuro:  CN II-XII grossly intact. Psych:  Normal mood and affect. Signed:  Alla Velasquez MD    Part of this note may have been written by using a voice dictation software. The note has been proof read but may still contain some grammatical/other typographical errors.

## 2023-06-21 NOTE — CARE COORDINATION
Patient with discharge orders for today. Patient requesting transport to [facility]. Round trip to be provided. Patient has me all treatment goals and milestones for discharge. CM following until patient is discharged. 06/21/23 1409   Service Assessment   Patient Orientation Alert and [facility] Discharge   Transition of Care Consult (CM Consult) Discharge Planning   Services [address] [facility] Discharge Transport   [facility] Information Provided? No   Mode of Transport at Discharge Self   Confirm Follow Up Transport Self   Condition of Participation: Discharge Planning   The Plan for Transition of Care is related to the following treatment goals: Return to baseline   The Patient and/or Patient Representative was provided with a Choice of Provider? Patient   The Patient and/Or Patient Representative agree with the Discharge Plan? Yes   Freedom of Choice list was provided with basic dialogue that supports the patient's individualized plan of care/goals, treatment preferences, and shares the quality data associated with the providers?   Yes

## 2023-06-21 NOTE — DISCHARGE INSTRUCTIONS
Please call [facility] to arrange a follow up appointment with 7 days of discharge. Your doctor wants you to be seen within 1 week by Outpatient Urology Clinic. They are going to call you within 48 hours if they have not called you by then please call them.  Their phone number is [phone]

## 2023-06-23 LAB
BACTERIA SPEC CULT: NORMAL
BACTERIA SPEC CULT: NORMAL
SERVICE CMNT-IMP: NORMAL
SERVICE CMNT-IMP: NORMAL

## 2023-06-27 ENCOUNTER — TELEPHONE (OUTPATIENT)
Dept: UROLOGY | Age: 73
End: 2023-06-27

## 2023-07-03 ENCOUNTER — HOSPITAL ENCOUNTER (EMERGENCY)
Age: 73
Discharge: HOME | End: 2023-07-03
Attending: EMERGENCY MEDICINE
Payer: MEDICARE

## 2023-07-03 VITALS
HEART RATE: 86 BPM | DIASTOLIC BLOOD PRESSURE: 73 MMHG | OXYGEN SATURATION: 96 % | TEMPERATURE: 98 F | RESPIRATION RATE: 16 BRPM | BODY MASS INDEX: 25.69 KG/M2 | SYSTOLIC BLOOD PRESSURE: 123 MMHG | HEIGHT: 67 IN

## 2023-07-03 DIAGNOSIS — Z01.30 BLOOD PRESSURE CHECK: ICD-10-CM

## 2023-07-03 DIAGNOSIS — Z59.00 HOMELESS: Primary | ICD-10-CM

## 2023-07-03 PROCEDURE — 99283 EMERGENCY DEPT VISIT LOW MDM: CPT

## 2023-07-03 SDOH — ECONOMIC STABILITY - HOUSING INSECURITY: HOMELESSNESS UNSPECIFIED: Z59.00

## 2023-07-03 ASSESSMENT — PAIN - FUNCTIONAL ASSESSMENT: PAIN_FUNCTIONAL_ASSESSMENT: 0-10

## 2023-07-03 ASSESSMENT — ENCOUNTER SYMPTOMS
RESPIRATORY NEGATIVE: 1
GASTROINTESTINAL NEGATIVE: 1

## 2023-07-03 ASSESSMENT — PAIN SCALES - GENERAL: PAINLEVEL_OUTOF10: 0
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
## 2023-07-06 ENCOUNTER — HOSPITAL ENCOUNTER (EMERGENCY)
Dept: CT IMAGING | Age: 73
Discharge: HOME | End: 2023-07-09
Payer: MEDICARE

## 2023-07-06 ENCOUNTER — HOSPITAL ENCOUNTER (EMERGENCY)
Age: 73
Discharge: HOME | End: 2023-07-06
Attending: EMERGENCY MEDICINE
Payer: MEDICARE

## 2023-07-06 VITALS
HEART RATE: 75 BPM | DIASTOLIC BLOOD PRESSURE: 84 MMHG | HEIGHT: 67 IN | BODY MASS INDEX: 26.68 KG/M2 | SYSTOLIC BLOOD PRESSURE: 138 MMHG | WEIGHT: 170 LBS | OXYGEN SATURATION: 97 % | TEMPERATURE: 97.5 F | RESPIRATION RATE: 18 BRPM

## 2023-07-06 DIAGNOSIS — S00.03XA SCALP HEMATOMA, INITIAL ENCOUNTER: ICD-10-CM

## 2023-07-06 DIAGNOSIS — S06.0X1A CLOSED HEAD INJURY WITH CONCUSSION, WITH LOSS OF CONSCIOUSNESS OF 30 MINUTES OR LESS, INITIAL ENCOUNTER: Primary | ICD-10-CM

## 2023-07-06 PROCEDURE — 6370000000 HC RX 637 (ALT 250 FOR IP): Performed by: EMERGENCY MEDICINE

## 2023-07-06 PROCEDURE — 99284 EMERGENCY DEPT VISIT MOD MDM: CPT

## 2023-07-06 PROCEDURE — 70450 CT HEAD/BRAIN W/O DYE: CPT

## 2023-07-06 PROCEDURE — 72125 CT NECK SPINE W/O DYE: CPT

## 2023-07-06 RX ORDER — HYDROCODONE BITARTRATE AND ACETAMINOPHEN 10; 325 MG/1; MG/1
1 TABLET ORAL
Status: COMPLETED | OUTPATIENT
Start: 2023-07-06 | End: 2023-07-06

## 2023-07-06 RX ADMIN — HYDROCODONE BITARTRATE AND ACETAMINOPHEN 1 TABLET: 10; 325 TABLET ORAL at 04:17

## 2023-07-06 ASSESSMENT — PAIN - FUNCTIONAL ASSESSMENT: PAIN_FUNCTIONAL_ASSESSMENT: 0-10

## 2023-07-06 ASSESSMENT — PAIN DESCRIPTION - LOCATION
LOCATION: HEAD
LOCATION: HEAD

## 2023-07-06 ASSESSMENT — PAIN SCALES - GENERAL
PAINLEVEL_OUTOF10: 10
PAINLEVEL_OUTOF10: 10
PAINLEVEL_OUTOF10: 6

## 2023-07-21 PROBLEM — N39.0 UTI (URINARY TRACT INFECTION): Status: RESOLVED | Noted: 2023-06-18 | Resolved: 2023-07-21

## 2023-08-06 ENCOUNTER — HOSPITAL ENCOUNTER (EMERGENCY)
Age: 73
Discharge: HOME | End: 2023-08-06
Attending: EMERGENCY MEDICINE
Payer: MEDICARE

## 2023-08-06 ENCOUNTER — APPOINTMENT (OUTPATIENT)
Dept: GENERAL RADIOLOGY | Age: 73
End: 2023-08-06
Payer: MEDICARE

## 2023-08-06 VITALS
HEART RATE: 80 BPM | RESPIRATION RATE: 16 BRPM | OXYGEN SATURATION: 97 % | TEMPERATURE: 98.5 F | WEIGHT: 170 LBS | DIASTOLIC BLOOD PRESSURE: 109 MMHG | SYSTOLIC BLOOD PRESSURE: 135 MMHG | HEIGHT: 67 IN | BODY MASS INDEX: 26.68 KG/M2

## 2023-08-06 DIAGNOSIS — M79.604 RIGHT LEG PAIN: Primary | ICD-10-CM

## 2023-08-06 DIAGNOSIS — T14.8XXA MUSCLE STRAIN: ICD-10-CM

## 2023-08-06 PROCEDURE — 73562 X-RAY EXAM OF KNEE 3: CPT

## 2023-08-06 PROCEDURE — 99283 EMERGENCY DEPT VISIT LOW MDM: CPT

## 2023-08-06 PROCEDURE — 73502 X-RAY EXAM HIP UNI 2-3 VIEWS: CPT

## 2023-08-06 PROCEDURE — 6370000000 HC RX 637 (ALT 250 FOR IP): Performed by: EMERGENCY MEDICINE

## 2023-08-06 RX ORDER — NAPROXEN 500 MG/1
500 TABLET ORAL 2 TIMES DAILY WITH MEALS
Qty: 28 TABLET | Refills: 0 | Status: SHIPPED | OUTPATIENT
Start: 2023-08-06 | End: 2023-08-20

## 2023-08-06 RX ORDER — METHOCARBAMOL 750 MG/1
750 TABLET, FILM COATED ORAL 4 TIMES DAILY
Qty: 40 TABLET | Refills: 0 | Status: SHIPPED | OUTPATIENT
Start: 2023-08-06 | End: 2023-08-16

## 2023-08-06 RX ORDER — NAPROXEN 250 MG/1
500 TABLET ORAL
Status: COMPLETED | OUTPATIENT
Start: 2023-08-06 | End: 2023-08-06

## 2023-08-06 RX ORDER — METAXALONE 800 MG/1
800 TABLET ORAL
Status: COMPLETED | OUTPATIENT
Start: 2023-08-06 | End: 2023-08-06

## 2023-08-06 RX ADMIN — NAPROXEN 500 MG: 250 TABLET ORAL at 11:17

## 2023-08-06 RX ADMIN — METAXALONE 800 MG: 800 TABLET ORAL at 11:18

## 2023-08-06 ASSESSMENT — PAIN SCALES - GENERAL: PAINLEVEL_OUTOF10: 8

## 2023-08-06 ASSESSMENT — PAIN - FUNCTIONAL ASSESSMENT: PAIN_FUNCTIONAL_ASSESSMENT: 0-10

## 2023-08-06 ASSESSMENT — PATIENT HEALTH QUESTIONNAIRE - PHQ9: SUM OF ALL RESPONSES TO PHQ QUESTIONS 1-9: 0

## 2023-08-06 ASSESSMENT — PAIN DESCRIPTION - DESCRIPTORS: DESCRIPTORS: ACHING

## 2023-08-06 ASSESSMENT — PAIN DESCRIPTION - LOCATION: LOCATION: GROIN;KNEE

## 2023-08-06 ASSESSMENT — ENCOUNTER SYMPTOMS: BACK PAIN: 0

## 2023-08-06 ASSESSMENT — PAIN DESCRIPTION - ORIENTATION: ORIENTATION: RIGHT

## 2023-08-06 NOTE — DISCHARGE INSTRUCTIONS
Take medications as directed  Ice and elevate  After 24 hours apply heat to the sore muscle  Follow-up with your primary care provider    Return to ER for any worsening symptoms or new problems which may arise

## 2023-08-06 NOTE — ED TRIAGE NOTES
Pt arrives via [facility] from community with [place] mechanical fall forward while grabbing cane. Did not hit head, no LOC.  Reports right pposterior thigh pain

## 2023-10-02 ENCOUNTER — HOSPITAL ENCOUNTER (EMERGENCY)
Age: 73
Discharge: HOME | End: 2023-10-02
Attending: EMERGENCY MEDICINE
Payer: MEDICARE

## 2023-10-02 ENCOUNTER — APPOINTMENT (OUTPATIENT)
Dept: ULTRASOUND IMAGING | Age: 73
End: 2023-10-02
Payer: MEDICARE

## 2023-10-02 VITALS
TEMPERATURE: 97.8 F | OXYGEN SATURATION: 98 % | SYSTOLIC BLOOD PRESSURE: 116 MMHG | DIASTOLIC BLOOD PRESSURE: 95 MMHG | HEART RATE: 62 BPM | HEIGHT: 67 IN | RESPIRATION RATE: 18 BRPM | WEIGHT: 170 LBS | BODY MASS INDEX: 26.68 KG/M2

## 2023-10-02 DIAGNOSIS — N45.1 LEFT EPIDIDYMITIS: Primary | ICD-10-CM

## 2023-10-02 PROCEDURE — 6370000000 HC RX 637 (ALT 250 FOR IP): Performed by: EMERGENCY MEDICINE

## 2023-10-02 PROCEDURE — 76870 US EXAM SCROTUM: CPT

## 2023-10-02 PROCEDURE — 99284 EMERGENCY DEPT VISIT MOD MDM: CPT

## 2023-10-02 RX ORDER — CIPROFLOXACIN 500 MG/1
500 TABLET, FILM COATED ORAL 2 TIMES DAILY
Qty: 20 TABLET | Refills: 0 | Status: SHIPPED | OUTPATIENT
Start: 2023-10-02 | End: 2023-10-12

## 2023-10-02 RX ORDER — CIPROFLOXACIN 500 MG/1
500 TABLET, FILM COATED ORAL ONCE
Status: COMPLETED | OUTPATIENT
Start: 2023-10-02 | End: 2023-10-02

## 2023-10-02 RX ADMIN — CIPROFLOXACIN 500 MG: 500 TABLET, FILM COATED ORAL at 17:21

## 2023-10-02 ASSESSMENT — PAIN SCALES - GENERAL
PAINLEVEL_OUTOF10: 7
PAINLEVEL_OUTOF10: 5

## 2023-10-02 ASSESSMENT — PAIN DESCRIPTION - ORIENTATION
ORIENTATION: LEFT
ORIENTATION: LEFT

## 2023-10-02 ASSESSMENT — PAIN DESCRIPTION - DESCRIPTORS
DESCRIPTORS: ACHING
DESCRIPTORS: SHARP

## 2023-10-02 ASSESSMENT — PAIN DESCRIPTION - LOCATION
LOCATION: PERINEUM
LOCATION: GROIN

## 2023-10-02 ASSESSMENT — PAIN - FUNCTIONAL ASSESSMENT: PAIN_FUNCTIONAL_ASSESSMENT: 0-10

## 2023-10-02 NOTE — ED PROVIDER NOTES
Emergency Department Provider Note       PCP: On File Not (Inactive)   Age: 72 y.o. Sex: male     DISPOSITION Decision To Discharge 10/02/2023 05:14:50 PM       ICD-10-CM    1. Left epididymitis  N45.1           Medical Decision Making     I reviewed his previous admission which indicated fairly severe left-sided epididymitis with concern for torsion. I will get an ultrasound to evaluate for torsion or other testicular issue. Complexity of Problems Addressed:  Complexity of Problem: 1 acute, uncomplicated illness or injury. Data Reviewed and Analyzed:  I independently ordered and reviewed each unique test.     The patients assessment required an independent historian: EMS. The reason they were needed is prehospital assessment and treatment. Reviewed the ultrasound. No obvious signs of abscess or significant scrotal edema. Discussion of management or test interpretation. ED Course as of 10/02/23 1717   Mon Oct 02, 2023   1714 His ultrasound does show some evidence for possible early epididymitis. I will treat with some oral Cipro. I will give him a dose in the ER and discharge him home with a prescription for. [AC]      ED Course User Index  [AC] Constantino Purcell MD       Risk of Complications and/or Morbidity of Patient Management:  Prescription drug management performed and Shared medical decision making was utilized in creating the patients health plan today. History      68-year-old gentleman presents with concerns about left testicular pain that is started over the last few hours. Patient says he has a history of similar and had to be admitted for it in the past.  He denies any specific injury or trauma and says he was not doing anything at the time that the pain started. He says it may be a little bit swollen but does not seem significantly larger than the right testicle. He denies any scrotal redness or swelling. He has had no penile issues.     No other associated symptoms

## 2023-10-02 NOTE — ED TRIAGE NOTES
[facility] via [facility]. Pt c/o left testicular pain without injury. Pt states he has been here for pain in his other testicle and it went away. Pt states this pain started when he woke up. EMS states VSS.

## 2023-10-02 NOTE — DISCHARGE INSTRUCTIONS
Please return with any fevers, testicular swelling or redness, worsening symptoms, or additional concerns. Follow-up with your primary care doctor for reevaluation in 3 to 4 days.

## 2023-10-17 ENCOUNTER — HOSPITAL ENCOUNTER (EMERGENCY)
Age: 73
Discharge: HOME | End: 2023-10-17
Attending: EMERGENCY MEDICINE
Payer: MEDICARE

## 2023-10-17 VITALS
HEART RATE: 62 BPM | TEMPERATURE: 98 F | RESPIRATION RATE: 23 BRPM | OXYGEN SATURATION: 99 % | SYSTOLIC BLOOD PRESSURE: 120 MMHG | DIASTOLIC BLOOD PRESSURE: 81 MMHG | HEIGHT: 67 IN | WEIGHT: 167 LBS | BODY MASS INDEX: 26.21 KG/M2

## 2023-10-17 DIAGNOSIS — E86.0 DEHYDRATION: Primary | ICD-10-CM

## 2023-10-17 DIAGNOSIS — R53.83 OTHER FATIGUE: ICD-10-CM

## 2023-10-17 DIAGNOSIS — T73.0XXA HUNGRY, INITIAL ENCOUNTER: ICD-10-CM

## 2023-10-17 DIAGNOSIS — Z59.00 HOMELESSNESS: ICD-10-CM

## 2023-10-17 LAB
ALBUMIN SERPL-MCNC: 2.8 G/DL (ref 3.2–4.6)
ALBUMIN/GLOB SERPL: 0.9 (ref 0.4–1.6)
ALP SERPL-CCNC: 60 U/L (ref 50–136)
ALT SERPL-CCNC: 24 U/L (ref 12–65)
ANION GAP SERPL CALC-SCNC: 8 MMOL/L (ref 2–11)
AST SERPL-CCNC: 31 U/L (ref 15–37)
BILIRUB SERPL-MCNC: 0.6 MG/DL (ref 0.2–1.1)
BUN SERPL-MCNC: 16 MG/DL (ref 8–23)
CALCIUM SERPL-MCNC: 8.3 MG/DL (ref 8.3–10.4)
CHLORIDE SERPL-SCNC: 110 MMOL/L (ref 101–110)
CO2 SERPL-SCNC: 25 MMOL/L (ref 21–32)
CREAT SERPL-MCNC: 0.9 MG/DL (ref 0.8–1.5)
EKG ATRIAL RATE: 60 BPM
EKG DIAGNOSIS: NORMAL
EKG P AXIS: 43 DEGREES
EKG P-R INTERVAL: 124 MS
EKG Q-T INTERVAL: 454 MS
EKG QRS DURATION: 89 MS
EKG QTC CALCULATION (BAZETT): 458 MS
EKG R AXIS: 47 DEGREES
EKG T AXIS: 54 DEGREES
EKG VENTRICULAR RATE: 61 BPM
ERYTHROCYTE [DISTWIDTH] IN BLOOD BY AUTOMATED COUNT: 12.9 % (ref 11.9–14.6)
ETHANOL SERPL-MCNC: 12 MG/DL (ref 0–0.08)
GLOBULIN SER CALC-MCNC: 3 G/DL (ref 2.8–4.5)
GLUCOSE SERPL-MCNC: 108 MG/DL (ref 65–100)
HCT VFR BLD AUTO: 39.3 % (ref 41.1–50.3)
HGB BLD-MCNC: 13.1 G/DL (ref 13.6–17.2)
MAGNESIUM SERPL-MCNC: 2.1 MG/DL (ref 1.8–2.4)
MCH RBC QN AUTO: 30.5 PG (ref 26.1–32.9)
MCHC RBC AUTO-ENTMCNC: 33.3 G/DL (ref 31.4–35)
MCV RBC AUTO: 91.4 FL (ref 82–102)
NRBC # BLD: 0 K/UL (ref 0–0.2)
PLATELET # BLD AUTO: 164 K/UL (ref 150–450)
PMV BLD AUTO: 10.9 FL (ref 9.4–12.3)
POTASSIUM SERPL-SCNC: 4.3 MMOL/L (ref 3.5–5.1)
PROT SERPL-MCNC: 5.8 G/DL (ref 6.3–8.2)
RBC # BLD AUTO: 4.3 M/UL (ref 4.23–5.6)
SODIUM SERPL-SCNC: 143 MMOL/L (ref 133–143)
WBC # BLD AUTO: 7.2 K/UL (ref 4.3–11.1)

## 2023-10-17 PROCEDURE — 93005 ELECTROCARDIOGRAM TRACING: CPT | Performed by: EMERGENCY MEDICINE

## 2023-10-17 PROCEDURE — 96360 HYDRATION IV INFUSION INIT: CPT

## 2023-10-17 PROCEDURE — 2580000003 HC RX 258: Performed by: EMERGENCY MEDICINE

## 2023-10-17 PROCEDURE — 83735 ASSAY OF MAGNESIUM: CPT

## 2023-10-17 PROCEDURE — 80053 COMPREHEN METABOLIC PANEL: CPT

## 2023-10-17 PROCEDURE — 82077 ASSAY SPEC XCP UR&BREATH IA: CPT

## 2023-10-17 PROCEDURE — 93010 ELECTROCARDIOGRAM REPORT: CPT | Performed by: INTERNAL MEDICINE

## 2023-10-17 PROCEDURE — 99284 EMERGENCY DEPT VISIT MOD MDM: CPT

## 2023-10-17 PROCEDURE — 85027 COMPLETE CBC AUTOMATED: CPT

## 2023-10-17 RX ORDER — SODIUM CHLORIDE, SODIUM LACTATE, POTASSIUM CHLORIDE, AND CALCIUM CHLORIDE .6; .31; .03; .02 G/100ML; G/100ML; G/100ML; G/100ML
1000 INJECTION, SOLUTION INTRAVENOUS
Status: COMPLETED | OUTPATIENT
Start: 2023-10-17 | End: 2023-10-17

## 2023-10-17 RX ADMIN — SODIUM CHLORIDE, POTASSIUM CHLORIDE, SODIUM LACTATE AND CALCIUM CHLORIDE 1000 ML: 600; 310; 30; 20 INJECTION, SOLUTION INTRAVENOUS at 14:21

## 2023-10-17 SDOH — ECONOMIC STABILITY - HOUSING INSECURITY: HOMELESSNESS UNSPECIFIED: Z59.00

## 2023-10-17 NOTE — ED TRIAGE NOTES
Pt arrives via ems from [facility] c/o generalized weakness, dizziness for the past few days - gradually worsening. Pt says he has not had adequate food/water intake. Denies n/v/d, chest pain. Pt received 500 ml NS en route.

## 2023-10-17 NOTE — ED PROVIDER NOTES
Neurological:      General: No focal deficit present. Mental Status: He is alert and oriented to person, place, and time. Mental status is at baseline. Cranial Nerves: No cranial nerve deficit. Sensory: No sensory deficit. Motor: No weakness. Coordination: Coordination normal.      Comments: Clear speech NIH is 0   Psychiatric:         Mood and Affect: Mood normal.         Behavior: Behavior normal.          Procedures     Procedures    Orders Placed This Encounter   Procedures    CBC    Comprehensive Metabolic Panel    Magnesium    Alcohol    EKG 12 Lead        Medications given during this emergency department visit:  Medications   lactated ringers bolus bolus 1,000 mL (1,000 mLs IntraVENous New Bag 10/17/23 1421)       New Prescriptions    No medications on file        Past Medical History:   Diagnosis Date    Prostate cancer [facility])         Past Surgical History:   Procedure Laterality Date    TURP N/A 6/10/2023    CYSTOSCOPY TRANSURETHRAL RESECTION PROSTATE/ rm 306 performed by Marsha Seals MD at [facility] MAIN OR        Social History     Socioeconomic History    Marital status:    Tobacco Use    Smoking status: Every Day     Packs/day: .5     Types: Cigarettes    Smokeless tobacco: Never   Vaping Use    Vaping Use: Never used   Substance and Sexual Activity    Alcohol use: Yes     Alcohol/week: 1.0 standard drink of alcohol     Types: 1 Cans of beer per week    Drug use: Never        Previous Medications    ACETAMINOPHEN (TYLENOL) 500 MG TABLET    Take 1 tablet by mouth every 6 hours as needed for Pain    ATORVASTATIN (LIPITOR) 10 MG TABLET    Take 1 tablet by mouth at bedtime    DICLOFENAC (VOLTAREN) 50 MG EC TABLET    Take 1 tablet by mouth 2 times daily for 7 days    LISINOPRIL (PRINIVIL;ZESTRIL) 5 MG TABLET    Take 1 tablet by mouth in the morning.         Results for orders placed or performed during the hospital encounter of 10/17/23   CBC   Result Value Ref Range    WBC 7.2

## 2023-10-19 ENCOUNTER — HOSPITAL ENCOUNTER (EMERGENCY)
Age: 73
Discharge: HOME | End: 2023-10-19
Attending: EMERGENCY MEDICINE
Payer: MEDICARE

## 2023-10-19 VITALS
SYSTOLIC BLOOD PRESSURE: 112 MMHG | HEART RATE: 82 BPM | RESPIRATION RATE: 16 BRPM | TEMPERATURE: 98.1 F | OXYGEN SATURATION: 96 % | DIASTOLIC BLOOD PRESSURE: 82 MMHG

## 2023-10-19 DIAGNOSIS — S61.012A LACERATION OF LEFT THUMB WITHOUT FOREIGN BODY WITHOUT DAMAGE TO NAIL, INITIAL ENCOUNTER: Primary | ICD-10-CM

## 2023-10-19 PROCEDURE — 90471 IMMUNIZATION ADMIN: CPT | Performed by: EMERGENCY MEDICINE

## 2023-10-19 PROCEDURE — 6360000002 HC RX W HCPCS: Performed by: EMERGENCY MEDICINE

## 2023-10-19 PROCEDURE — 90714 TD VACC NO PRESV 7 YRS+ IM: CPT | Performed by: EMERGENCY MEDICINE

## 2023-10-19 PROCEDURE — 2500000003 HC RX 250 WO HCPCS: Performed by: EMERGENCY MEDICINE

## 2023-10-19 PROCEDURE — 12001 RPR S/N/AX/GEN/TRNK 2.5CM/<: CPT

## 2023-10-19 PROCEDURE — 99284 EMERGENCY DEPT VISIT MOD MDM: CPT

## 2023-10-19 RX ORDER — TETANUS AND DIPHTHERIA TOXOIDS ADSORBED 2; 2 [LF]/.5ML; [LF]/.5ML
0.5 INJECTION INTRAMUSCULAR
Status: COMPLETED | OUTPATIENT
Start: 2023-10-19 | End: 2023-10-19

## 2023-10-19 RX ORDER — CEPHALEXIN 500 MG/1
500 CAPSULE ORAL 3 TIMES DAILY
Qty: 30 CAPSULE | Refills: 0 | Status: SHIPPED | OUTPATIENT
Start: 2023-10-19 | End: 2023-10-29

## 2023-10-19 RX ORDER — LIDOCAINE HYDROCHLORIDE 10 MG/ML
5 INJECTION, SOLUTION INFILTRATION; PERINEURAL
Status: COMPLETED | OUTPATIENT
Start: 2023-10-19 | End: 2023-10-19

## 2023-10-19 RX ADMIN — LIDOCAINE HYDROCHLORIDE 5 ML: 10 INJECTION, SOLUTION INFILTRATION; PERINEURAL at 22:38

## 2023-10-19 RX ADMIN — TETANUS AND DIPHTHERIA TOXOIDS ADSORBED 0.5 ML: 2; 2 INJECTION INTRAMUSCULAR at 20:53

## 2023-10-19 ASSESSMENT — ENCOUNTER SYMPTOMS
COLOR CHANGE: 0
BACK PAIN: 0
VOMITING: 0
SHORTNESS OF BREATH: 0
NAUSEA: 0
COUGH: 0
ABDOMINAL PAIN: 0
DIARRHEA: 0
CONSTIPATION: 0
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
## 2023-11-12 ENCOUNTER — HOSPITAL ENCOUNTER (EMERGENCY)
Age: 73
Discharge: HOME | End: 2023-11-12
Attending: EMERGENCY MEDICINE
Payer: MEDICARE

## 2023-11-12 ENCOUNTER — APPOINTMENT (OUTPATIENT)
Dept: GENERAL RADIOLOGY | Age: 73
End: 2023-11-12
Payer: MEDICARE

## 2023-11-12 VITALS
RESPIRATION RATE: 20 BRPM | SYSTOLIC BLOOD PRESSURE: 130 MMHG | TEMPERATURE: 97.1 F | OXYGEN SATURATION: 99 % | HEART RATE: 77 BPM | DIASTOLIC BLOOD PRESSURE: 111 MMHG

## 2023-11-12 DIAGNOSIS — M17.10 ARTHRITIS OF KNEE: ICD-10-CM
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
## 2023-11-12 DIAGNOSIS — S63.511A SPRAIN OF CARPAL JOINT OF RIGHT WRIST, INITIAL ENCOUNTER: ICD-10-CM

## 2023-11-12 PROCEDURE — 73110 X-RAY EXAM OF WRIST: CPT

## 2023-11-12 PROCEDURE — 6370000000 HC RX 637 (ALT 250 FOR IP): Performed by: EMERGENCY MEDICINE

## 2023-11-12 PROCEDURE — 99283 EMERGENCY DEPT VISIT LOW MDM: CPT

## 2023-11-12 RX ORDER — IBUPROFEN 800 MG/1
800 TABLET ORAL
Status: COMPLETED | OUTPATIENT
Start: 2023-11-12 | End: 2023-11-12

## 2023-11-12 RX ADMIN — IBUPROFEN 800 MG: 800 TABLET ORAL at 17:45

## 2023-11-12 ASSESSMENT — ENCOUNTER SYMPTOMS
BACK PAIN: 0
ABDOMINAL DISTENTION: 0

## 2023-11-12 ASSESSMENT — PAIN SCALES - GENERAL: PAINLEVEL_OUTOF10: 4

## 2023-11-12 ASSESSMENT — PAIN - FUNCTIONAL ASSESSMENT: PAIN_FUNCTIONAL_ASSESSMENT: 0-10

## 2023-11-12 NOTE — DISCHARGE INSTRUCTIONS
Wear wrist splint 4 to 5 days. Over-the-counter ibuprofen or Tylenol for pain. Number below to call to get primary care doctor. Recheck 1 week if not improved.

## 2023-11-12 NOTE — ED PROVIDER NOTES
Emergency Department Provider Note       PCP: Not, On File (Inactive)   Age: 72 y.o. Sex: male     DISPOSITION Decision To Discharge 11/12/2023 06:38:15 PM       ICD-10-CM    1. Fall, initial encounter  Extension Nick Corona. XXXA       2. Sprain of carpal joint of right wrist, initial encounter  S63.511A           Medical Decision Making   Chemical, nonsyncopal fall. Patient states that his leg gives way on occasion due to previous knee replacement. right hand intact  Imaging of right wrist.  No syncope. Do not believe further work-up needed other than assessing for fracture. Complexity of Problems Addressed:  1 or more acute illnesses that pose a threat to life or bodily function. Data Reviewed and Analyzed:   I independently ordered and reviewed each unique test.  I reviewed external records: provider visit note from outside specialist.  Recent urologic surgery, prostate in the last 4 months. Also recent issues with emergency department visit another facility for amphetamine use. Also history of hypertension and patient is undomiciled  The patients assessment required an independent historian: EMS. The reason they were needed is important historical information not provided by the patient. I interpreted the X-rays with radiologist.  No fracture. Discussion of management or test interpretation. Patient without snuffbox tenderness, faint pain with thumb compression or ulnar deviation. Majority of the pain is more on the ulnar aspect of the wrist.  We will place an splint. Given information for follow-up      Risk of Complications and/or Morbidity of Patient Management:  Diagnosis or care significantly limited by social determinants of health homelessness. Is this patient to be included in the SEP-1 core measure due to severe sepsis or septic shock? No Exclusion criteria - the patient is NOT to be included for SEP-1 Core Measure due to:  Infection is not suspected      History      72-year-old male
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
## 2023-11-26 ENCOUNTER — HOSPITAL ENCOUNTER (EMERGENCY)
Age: 73
Discharge: HOME | End: 2023-11-26
Attending: EMERGENCY MEDICINE
Payer: MEDICARE

## 2023-11-26 ENCOUNTER — APPOINTMENT (OUTPATIENT)
Dept: GENERAL RADIOLOGY | Age: 73
End: 2023-11-26
Payer: MEDICARE

## 2023-11-26 VITALS
OXYGEN SATURATION: 99 % | HEIGHT: 67 IN | DIASTOLIC BLOOD PRESSURE: 104 MMHG | WEIGHT: 169 LBS | BODY MASS INDEX: 26.53 KG/M2 | SYSTOLIC BLOOD PRESSURE: 165 MMHG | HEART RATE: 83 BPM | TEMPERATURE: 97.8 F | RESPIRATION RATE: 14 BRPM

## 2023-11-26 DIAGNOSIS — S62.301A CLOSED NONDISPLACED FRACTURE OF SECOND METACARPAL BONE OF LEFT HAND, UNSPECIFIED PORTION OF METACARPAL, INITIAL ENCOUNTER: ICD-10-CM

## 2023-11-26 DIAGNOSIS — S62.292A CLOSED NONDISPLACED FRACTURE OF OTHER PART OF FIRST METACARPAL BONE OF LEFT HAND, INITIAL ENCOUNTER: Primary | ICD-10-CM

## 2023-11-26 PROCEDURE — 72100 X-RAY EXAM L-S SPINE 2/3 VWS: CPT

## 2023-11-26 PROCEDURE — 29125 APPL SHORT ARM SPLINT STATIC: CPT

## 2023-11-26 PROCEDURE — 73120 X-RAY EXAM OF HAND: CPT

## 2023-11-26 PROCEDURE — 99283 EMERGENCY DEPT VISIT LOW MDM: CPT

## 2023-11-26 ASSESSMENT — ENCOUNTER SYMPTOMS
SHORTNESS OF BREATH: 0
VOMITING: 0
ABDOMINAL PAIN: 0
COLOR CHANGE: 0
ABDOMINAL SWELLING: 0
BACK PAIN: 1
CONSTIPATION: 0
COUGH: 0
NAUSEA: 0
DIARRHEA: 0
BOWEL INCONTINENCE: 0

## 2023-11-26 ASSESSMENT — PAIN SCALES - GENERAL: PAINLEVEL_OUTOF10: 7

## 2023-11-26 ASSESSMENT — PAIN - FUNCTIONAL ASSESSMENT: PAIN_FUNCTIONAL_ASSESSMENT: 0-10
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
## 2023-12-01 ENCOUNTER — HOSPITAL ENCOUNTER (EMERGENCY)
Age: 73
Discharge: HOME | End: 2023-12-01
Attending: INTERNAL MEDICINE
Payer: MEDICARE

## 2023-12-01 VITALS
OXYGEN SATURATION: 98 % | HEART RATE: 71 BPM | SYSTOLIC BLOOD PRESSURE: 142 MMHG | WEIGHT: 170 LBS | BODY MASS INDEX: 26.68 KG/M2 | RESPIRATION RATE: 17 BRPM | HEIGHT: 67 IN | DIASTOLIC BLOOD PRESSURE: 72 MMHG | TEMPERATURE: 97.7 F

## 2023-12-01 DIAGNOSIS — S62.92XA CLOSED FRACTURE OF LEFT HAND, INITIAL ENCOUNTER: Primary | ICD-10-CM

## 2023-12-01 PROCEDURE — 99282 EMERGENCY DEPT VISIT SF MDM: CPT

## 2023-12-01 ASSESSMENT — PAIN - FUNCTIONAL ASSESSMENT: PAIN_FUNCTIONAL_ASSESSMENT: 0-10

## 2023-12-01 ASSESSMENT — PAIN SCALES - GENERAL: PAINLEVEL_OUTOF10: 7
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
## 2023-12-01 NOTE — DISCHARGE INSTRUCTIONS
Wear splint for comfort, alternate tylenol and motrin fo pain, call office to arrange follow up appt

## 2023-12-10 ENCOUNTER — HOSPITAL ENCOUNTER (EMERGENCY)
Age: 73
Discharge: HOME | End: 2023-12-10
Payer: MEDICARE

## 2023-12-10 ENCOUNTER — APPOINTMENT (OUTPATIENT)
Dept: GENERAL RADIOLOGY | Age: 73
End: 2023-12-10
Payer: MEDICARE

## 2023-12-10 VITALS
DIASTOLIC BLOOD PRESSURE: 107 MMHG | HEART RATE: 71 BPM | WEIGHT: 180 LBS | HEIGHT: 61 IN | RESPIRATION RATE: 16 BRPM | BODY MASS INDEX: 33.99 KG/M2 | TEMPERATURE: 97.9 F | SYSTOLIC BLOOD PRESSURE: 169 MMHG | OXYGEN SATURATION: 98 %

## 2023-12-10 VITALS
SYSTOLIC BLOOD PRESSURE: 160 MMHG | RESPIRATION RATE: 17 BRPM | OXYGEN SATURATION: 98 % | HEART RATE: 82 BPM | TEMPERATURE: 98.9 F | DIASTOLIC BLOOD PRESSURE: 82 MMHG

## 2023-12-10 DIAGNOSIS — Z76.5 MALINGERING: Primary | ICD-10-CM

## 2023-12-10 DIAGNOSIS — S62.92XK: Primary | ICD-10-CM

## 2023-12-10 PROCEDURE — 99283 EMERGENCY DEPT VISIT LOW MDM: CPT

## 2023-12-10 PROCEDURE — 73502 X-RAY EXAM HIP UNI 2-3 VIEWS: CPT

## 2023-12-10 PROCEDURE — 99282 EMERGENCY DEPT VISIT SF MDM: CPT

## 2023-12-10 PROCEDURE — 73110 X-RAY EXAM OF WRIST: CPT

## 2023-12-10 RX ORDER — NAPROXEN 500 MG/1
500 TABLET ORAL 2 TIMES DAILY PRN
Qty: 30 TABLET | Refills: 0 | Status: SHIPPED | OUTPATIENT
Start: 2023-12-10

## 2023-12-10 ASSESSMENT — ENCOUNTER SYMPTOMS
RESPIRATORY NEGATIVE: 1
EYES NEGATIVE: 1
ALLERGIC/IMMUNOLOGIC NEGATIVE: 1
GASTROINTESTINAL NEGATIVE: 1

## 2023-12-10 ASSESSMENT — PAIN SCALES - GENERAL: PAINLEVEL_OUTOF10: 5

## 2023-12-10 NOTE — ED PROVIDER NOTES
Emergency Department Provider Note       PCP: Not, On File (Inactive)   Age: 72 y.o. Sex: male     DISPOSITION Decision To Discharge 12/10/2023 04:59:38 PM       ICD-10-CM    1. Malingering  Z76.5           Medical Decision Making     Complexity of Problems Addressed:  1 or more acute illnesses that pose a threat to life or bodily function. Data Reviewed and Analyzed:  I independently ordered and reviewed each unique test.  I reviewed external records: ED visit note from an outside group. The patients assessment required an independent historian: None. The reason they were needed is . Discussion of management or test interpretation. 4:55 PM spoke with Kasie Sosa, charge nurse regarding getting patient a ride. She states he has had 4 rides in the last month, we are unable to provide a ride today. Patient is homeless and of sound state in mind at this time. Not suicidal or homicidal.  He was sleeping in the lobby when I did call him. Patient has not had a recent injury since being discharged 1 to 2 hours ago. He ambulated into the room. Risk of Complications and/or Morbidity of Patient Management:  Considerations: The following items were considered but not ordered: Further evaluation. History       Patient was \"just discharged from here, waited for the bus for 2 hours, the bus don't run on Sunday. I checked in to get a ride back if that is what it takes to get a ride. \" He states he fell yesterday at the [facility]. He fell on the road. He is using a cane to ambulate. He has not had an injury since being discharged 1 to 2 hours ago. No other complaints at this time. No chest pain, shortness of breath, abdominal pain, dizziness, weakness, dyspnea exertion, orthopnea, swelling/tingling or weakness to their arms or legs, trouble with urination or bowel movements or other new symptoms. He did ambulate to the room without difficulty with his cane and is well hydrated.       The history is

## 2023-12-10 NOTE — ED TRIAGE NOTES
Pt arrives via ems from streets. Pt states falling last night resulting in bilateral hand pain and back pain. Pt denies loc, alert and oriented x 4, denies blood thinners.

## 2023-12-10 NOTE — DISCHARGE INSTRUCTIONS
You still have a broken bone in your left hand from a couple weeks ago. Use the splint given to you in the ER for protection and immobilization of the joint. Referral has been placed to [facility]. They will contact you. If you do not hear from them by Tuesday afternoon give them a call. Their information is listed on your discharge paperwork. I have also written you prescriptions for naproxen anti-inflammatory medication. Take this as needed for pain and inflammation relief as needed. Return as needed.
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
## 2023-12-12 ENCOUNTER — TELEPHONE (OUTPATIENT)
Dept: ORTHOPEDIC SURGERY | Age: 73
End: 2023-12-12

## 2023-12-12 NOTE — TELEPHONE ENCOUNTER
1st call spoke with patient he put me on hold never came back to the phone so I called him back and he just held the phone .  I made his appt in hopes he calls back to confirm

## 2024-01-03 ENCOUNTER — TELEPHONE (OUTPATIENT)
Dept: ORTHOPEDIC SURGERY | Age: 74
End: 2024-01-03

## 2024-01-12 ENCOUNTER — HOSPITAL ENCOUNTER (EMERGENCY)
Age: 74
Discharge: HOME | End: 2024-01-13
Attending: EMERGENCY MEDICINE
Payer: MEDICARE

## 2024-01-12 DIAGNOSIS — S09.90XA INJURY OF HEAD, INITIAL ENCOUNTER: Primary | ICD-10-CM

## 2024-01-12 DIAGNOSIS — Z78.9 ADMITS TO ALCOHOL CONSUMPTION: ICD-10-CM

## 2024-01-12 DIAGNOSIS — S50.01XA CONTUSION OF RIGHT ELBOW, INITIAL ENCOUNTER: ICD-10-CM

## 2024-01-12 PROCEDURE — 99284 EMERGENCY DEPT VISIT MOD MDM: CPT

## 2024-01-12 ASSESSMENT — PAIN DESCRIPTION - LOCATION: LOCATION: HEAD;ARM

## 2024-01-12 ASSESSMENT — PAIN DESCRIPTION - ORIENTATION: ORIENTATION: RIGHT

## 2024-01-12 ASSESSMENT — LIFESTYLE VARIABLES
HOW MANY STANDARD DRINKS CONTAINING ALCOHOL DO YOU HAVE ON A TYPICAL DAY: 1 OR 2
HOW OFTEN DO YOU HAVE A DRINK CONTAINING ALCOHOL: MONTHLY OR LESS

## 2024-01-12 ASSESSMENT — PAIN SCALES - GENERAL: PAINLEVEL_OUTOF10: 8

## 2024-01-12 ASSESSMENT — PAIN - FUNCTIONAL ASSESSMENT: PAIN_FUNCTIONAL_ASSESSMENT: 0-10

## 2024-01-12 ASSESSMENT — PAIN DESCRIPTION - DESCRIPTORS: DESCRIPTORS: ACHING

## 2024-01-13 ENCOUNTER — APPOINTMENT (OUTPATIENT)
Dept: GENERAL RADIOLOGY | Age: 74
End: 2024-01-13
Payer: MEDICARE

## 2024-01-13 ENCOUNTER — APPOINTMENT (OUTPATIENT)
Dept: CT IMAGING | Age: 74
End: 2024-01-13
Payer: MEDICARE

## 2024-01-13 VITALS
HEIGHT: 67 IN | TEMPERATURE: 98.1 F | HEART RATE: 79 BPM | DIASTOLIC BLOOD PRESSURE: 93 MMHG | RESPIRATION RATE: 20 BRPM | SYSTOLIC BLOOD PRESSURE: 150 MMHG | OXYGEN SATURATION: 93 % | BODY MASS INDEX: 26.63 KG/M2

## 2024-01-13 PROCEDURE — 72125 CT NECK SPINE W/O DYE: CPT

## 2024-01-13 PROCEDURE — 70450 CT HEAD/BRAIN W/O DYE: CPT

## 2024-01-13 PROCEDURE — 73080 X-RAY EXAM OF ELBOW: CPT

## 2024-01-13 PROCEDURE — 6370000000 HC RX 637 (ALT 250 FOR IP): Performed by: EMERGENCY MEDICINE

## 2024-01-13 RX ORDER — ACETAMINOPHEN 500 MG
1000 TABLET ORAL
Status: COMPLETED | OUTPATIENT
Start: 2024-01-13 | End: 2024-01-13

## 2024-01-13 RX ADMIN — ACETAMINOPHEN 1000 MG: 500 TABLET ORAL at 00:48

## 2024-01-13 ASSESSMENT — PAIN SCALES - GENERAL: PAINLEVEL_OUTOF10: 8

## 2024-01-13 ASSESSMENT — PAIN DESCRIPTION - LOCATION: LOCATION: HEAD

## 2024-01-13 ASSESSMENT — PAIN DESCRIPTION - DESCRIPTORS: DESCRIPTORS: ACHING

## 2024-01-13 NOTE — ED NOTES
I have reviewed discharge instructions with the patient.  The patient verbalized understanding.    Patient left ED via Discharge Method: wheelchair to lobby with self.    Opportunity for questions and clarification provided.       Patient given 0 scripts.         To continue your aftercare when you leave the hospital, you may receive an automated call from our care team to check in on how you are doing.  This is a free service and part of our promise to provide the best care and service to meet your aftercare needs.” If you have questions, or wish to unsubscribe from this service please call [phone].  Thank you for Choosing our [facility] Emergency Department.        Sofi Roa, SONDRA  01/13/24 4841

## 2024-01-13 NOTE — DISCHARGE INSTRUCTIONS
Apply ice to contusions  Apply Neosporin to your abrasions  Limit alcohol intake  Drink plenty of clear liquids  Follow-up with [facility] for [facility] medical care    Return to ER for any worsening symptoms or new problems which may arise

## 2024-01-13 NOTE — ED PROVIDER NOTES
through the cervical spine without  intravenous contrast. Coronal and sagittal reformatted images were also  reviewed.  Radiation dose reduction techniques were used for this study:  All CT  scans performed at this facility use one or all of the following: Automated  exposure control, adjustment of the mA and/or kVp according to patient's size,  iterative reconstruction.    FINDINGS:  No acute fracture seen.    Severe multilevel degenerative changes including disc base narrowing, endplate  sclerosis, osteophyte formation.  2 mm anterolisthesis C7-T1 consistent with underlying degenerative process.  Subcortical cystic change consistent with degenerative process.  Prevertebral soft tissues appear unremarkable.    Note:  Spine CT test sensitivity and specificity are less than spine MR for  degenerative changes and intrathecal lesions.        Impression    No acute fracture seen.    Chronic findings.         CT HEAD WO CONTRAST    Narrative    CT HEAD WO CONTRAST - 1/13/2024 12:35 AM    COMPARISON: 7/6/2023 CT brain without contrast    INDICATION: Head Injury.      TECHNIQUE:  Multiple axial images obtained through the brain without intravenous  contrast. Lack of IV contrast decreases test sensitivity, accuracy for the  vasculature and solid structures.   Radiation dose reduction techniques were  used for this study.  All CT scans performed at this facility use one or all of  the following. Automated exposure control, adjustment of the mA and/or kVp  according to patient's size, iterative reconstruction.    FINDINGS:  Moderate scattered white matter low-density changes, nonspecific, but often  related to chronic small vessel vasculopathy.  Mild chronic cerebral atrophy.  Old encephalomalacia of the left frontal lateral aspect.    No acute intracranial hemorrhage seen.  No mass lesion seen.  No midline shift or ventricular obstruction seen.      Impression    No acute intracranial abnormality identified.    Chronic
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
## 2024-01-22 ENCOUNTER — HOSPITAL ENCOUNTER (EMERGENCY)
Age: 74
Discharge: HOME | End: 2024-01-22
Attending: EMERGENCY MEDICINE
Payer: MEDICARE

## 2024-01-22 ENCOUNTER — APPOINTMENT (OUTPATIENT)
Dept: GENERAL RADIOLOGY | Age: 74
End: 2024-01-22
Payer: MEDICARE

## 2024-01-22 VITALS
RESPIRATION RATE: 16 BRPM | WEIGHT: 165 LBS | HEART RATE: 61 BPM | BODY MASS INDEX: 25.9 KG/M2 | DIASTOLIC BLOOD PRESSURE: 78 MMHG | HEIGHT: 67 IN | OXYGEN SATURATION: 97 % | SYSTOLIC BLOOD PRESSURE: 132 MMHG | TEMPERATURE: 97.7 F

## 2024-01-22 DIAGNOSIS — R07.9 CHEST PAIN OF UNCERTAIN ETIOLOGY: ICD-10-CM

## 2024-01-22 DIAGNOSIS — Z59.00 HOMELESSNESS: Primary | ICD-10-CM

## 2024-01-22 LAB
ALBUMIN SERPL-MCNC: 3.1 G/DL (ref 3.2–4.6)
ALBUMIN/GLOB SERPL: 0.8 (ref 0.4–1.6)
ALP SERPL-CCNC: 82 U/L (ref 50–136)
ALT SERPL-CCNC: 28 U/L (ref 12–65)
ANION GAP SERPL CALC-SCNC: 1 MMOL/L (ref 2–11)
AST SERPL-CCNC: 28 U/L (ref 15–37)
BASOPHILS # BLD: 0.1 K/UL (ref 0–0.2)
BASOPHILS NFR BLD: 1 % (ref 0–2)
BILIRUB SERPL-MCNC: 0.4 MG/DL (ref 0.2–1.1)
BUN SERPL-MCNC: 22 MG/DL (ref 8–23)
CALCIUM SERPL-MCNC: 8.7 MG/DL (ref 8.3–10.4)
CHLORIDE SERPL-SCNC: 109 MMOL/L (ref 103–113)
CO2 SERPL-SCNC: 29 MMOL/L (ref 21–32)
CREAT SERPL-MCNC: 1.2 MG/DL (ref 0.8–1.5)
DIFFERENTIAL METHOD BLD: ABNORMAL
EKG ATRIAL RATE: 62 BPM
EKG DIAGNOSIS: NORMAL
EKG P AXIS: 17 DEGREES
EKG P-R INTERVAL: 118 MS
EKG Q-T INTERVAL: 436 MS
EKG QRS DURATION: 92 MS
EKG QTC CALCULATION (BAZETT): 443 MS
EKG R AXIS: 40 DEGREES
EKG T AXIS: 45 DEGREES
EKG VENTRICULAR RATE: 62 BPM
EOSINOPHIL # BLD: 0.1 K/UL (ref 0–0.8)
EOSINOPHIL NFR BLD: 1 % (ref 0.5–7.8)
ERYTHROCYTE [DISTWIDTH] IN BLOOD BY AUTOMATED COUNT: 13.1 % (ref 11.9–14.6)
GLOBULIN SER CALC-MCNC: 3.7 G/DL (ref 2.8–4.5)
GLUCOSE SERPL-MCNC: 121 MG/DL (ref 65–100)
HCT VFR BLD AUTO: 45.4 % (ref 41.1–50.3)
HGB BLD-MCNC: 14.7 G/DL (ref 13.6–17.2)
IMM GRANULOCYTES # BLD AUTO: 0.5 K/UL (ref 0–0.5)
IMM GRANULOCYTES NFR BLD AUTO: 4 % (ref 0–5)
LYMPHOCYTES # BLD: 1.3 K/UL (ref 0.5–4.6)
LYMPHOCYTES NFR BLD: 11 % (ref 13–44)
MAGNESIUM SERPL-MCNC: 2.3 MG/DL (ref 1.8–2.4)
MCH RBC QN AUTO: 29.5 PG (ref 26.1–32.9)
MCHC RBC AUTO-ENTMCNC: 32.4 G/DL (ref 31.4–35)
MCV RBC AUTO: 91.2 FL (ref 82–102)
MONOCYTES # BLD: 0.7 K/UL (ref 0.1–1.3)
MONOCYTES NFR BLD: 6 % (ref 4–12)
NEUTS SEG # BLD: 9.3 K/UL (ref 1.7–8.2)
NEUTS SEG NFR BLD: 77 % (ref 43–78)
NRBC # BLD: 0 K/UL (ref 0–0.2)
PLATELET # BLD AUTO: 258 K/UL (ref 150–450)
PMV BLD AUTO: 9.6 FL (ref 9.4–12.3)
POTASSIUM SERPL-SCNC: 4.2 MMOL/L (ref 3.5–5.1)
PROT SERPL-MCNC: 6.8 G/DL (ref 6.3–8.2)
RBC # BLD AUTO: 4.98 M/UL (ref 4.23–5.6)
SODIUM SERPL-SCNC: 139 MMOL/L (ref 136–146)
TROPONIN I SERPL HS-MCNC: 6.6 PG/ML (ref 0–14)
WBC # BLD AUTO: 11.9 K/UL (ref 4.3–11.1)

## 2024-01-22 PROCEDURE — 83735 ASSAY OF MAGNESIUM: CPT

## 2024-01-22 PROCEDURE — 71045 X-RAY EXAM CHEST 1 VIEW: CPT

## 2024-01-22 PROCEDURE — 80053 COMPREHEN METABOLIC PANEL: CPT

## 2024-01-22 PROCEDURE — 99285 EMERGENCY DEPT VISIT HI MDM: CPT

## 2024-01-22 PROCEDURE — 94760 N-INVAS EAR/PLS OXIMETRY 1: CPT

## 2024-01-22 PROCEDURE — 93010 ELECTROCARDIOGRAM REPORT: CPT | Performed by: INTERNAL MEDICINE

## 2024-01-22 PROCEDURE — 93005 ELECTROCARDIOGRAM TRACING: CPT | Performed by: EMERGENCY MEDICINE

## 2024-01-22 PROCEDURE — 84484 ASSAY OF TROPONIN QUANT: CPT

## 2024-01-22 PROCEDURE — 85025 COMPLETE CBC W/AUTO DIFF WBC: CPT

## 2024-01-22 SDOH — ECONOMIC STABILITY - HOUSING INSECURITY: HOMELESSNESS UNSPECIFIED: Z59.00

## 2024-01-22 ASSESSMENT — PAIN - FUNCTIONAL ASSESSMENT
PAIN_FUNCTIONAL_ASSESSMENT: 0-10
PAIN_FUNCTIONAL_ASSESSMENT: 0-10
PAIN_FUNCTIONAL_ASSESSMENT: NONE - DENIES PAIN
PAIN_FUNCTIONAL_ASSESSMENT: 0-10

## 2024-01-22 ASSESSMENT — PAIN SCALES - GENERAL
PAINLEVEL_OUTOF10: 0
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
## 2024-01-22 NOTE — ED NOTES
I have reviewed discharge instructions with the patient.  The patient verbalized understanding.    Patient left ED via Discharge Method: ambulatory to [facility] with hospital-provided Uber.    Opportunity for questions and clarification provided.       Patient given 0 scripts.         To continue your aftercare when you leave the hospital, you may receive an automated call from our care team to check in on how you are doing.  This is a free service and part of our promise to provide the best care and service to meet your aftercare needs.” If you have questions, or wish to unsubscribe from this service please call [phone].  Thank you for Choosing our [facility] Emergency Department.        Sushma Fairbanks, RN  01/22/24 8666

## 2024-01-22 NOTE — DISCHARGE INSTRUCTIONS
You have been given resources by case management for shelters.    Your evaluation regarding her chest tightness is unremarkable today.  Return the emergency department for worsening of symptoms.

## 2024-01-22 NOTE — ED TRIAGE NOTES
Patient a 74 y/o Male reports to the ED via EMS with c/c of Shortness of Breath. States he was found by [employer] laying supine on the side walk. States he gets SOB when he gets very cold. EMS states he sounds congested and has been coughing up brown Phlegm for a couple of weeks. EMS originally could not get a Temp or O2 Sat but was able to get a SAT upon arrival of 98%. Patient is Homeless.
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
## 2024-02-04 ENCOUNTER — HOSPITAL ENCOUNTER (EMERGENCY)
Age: 74
Discharge: HOME | End: 2024-02-04
Attending: EMERGENCY MEDICINE
Payer: MEDICARE

## 2024-02-04 VITALS
BODY MASS INDEX: 26.68 KG/M2 | RESPIRATION RATE: 18 BRPM | WEIGHT: 170 LBS | HEIGHT: 67 IN | SYSTOLIC BLOOD PRESSURE: 102 MMHG | DIASTOLIC BLOOD PRESSURE: 80 MMHG | HEART RATE: 79 BPM | TEMPERATURE: 98.4 F | OXYGEN SATURATION: 100 %

## 2024-02-04 DIAGNOSIS — R19.7 DIARRHEA, UNSPECIFIED TYPE: Primary | ICD-10-CM

## 2024-02-04 PROCEDURE — 6370000000 HC RX 637 (ALT 250 FOR IP): Performed by: EMERGENCY MEDICINE

## 2024-02-04 PROCEDURE — 99283 EMERGENCY DEPT VISIT LOW MDM: CPT

## 2024-02-04 RX ORDER — LOPERAMIDE HYDROCHLORIDE 2 MG/1
CAPSULE ORAL
Qty: 16 CAPSULE | Refills: 0 | Status: SHIPPED | OUTPATIENT
Start: 2024-02-04

## 2024-02-04 RX ORDER — LOPERAMIDE HYDROCHLORIDE 2 MG/1
4 CAPSULE ORAL
Status: COMPLETED | OUTPATIENT
Start: 2024-02-04 | End: 2024-02-04

## 2024-02-04 RX ADMIN — LOPERAMIDE HYDROCHLORIDE 4 MG: 2 CAPSULE ORAL at 07:42

## 2024-02-04 ASSESSMENT — PAIN - FUNCTIONAL ASSESSMENT: PAIN_FUNCTIONAL_ASSESSMENT: 0-10

## 2024-02-04 ASSESSMENT — PAIN SCALES - GENERAL: PAINLEVEL_OUTOF10: 0

## 2024-02-04 NOTE — DISCHARGE INSTR - COC
(Video Swallowing Test): {Done Not Done Date:}    Treatments at the Time of Hospital Discharge:   Respiratory Treatments: ***  Oxygen Therapy:  {Therapy; copd oxygen:86689}  Ventilator:    {[facility] Vent List:256891706}    Rehab Therapies: {THERAPEUTIC INTERVENTION:5191196458}  Weight Bearing Status/Restrictions: { CC Weight Bearin}  Other Medical Equipment (for information only, NOT a DME order):  {EQUIPMENT:748394803}  Other Treatments: ***    Patient's personal belongings (please select all that are sent with patient):  {[facility] DME Belongings:920494873}    RN SIGNATURE:  {Esignature:671371460}    CASE MANAGEMENT/SOCIAL WORK SECTION    Inpatient Status Date: ***    Readmission Risk Assessment Score:  Readmission Risk              Risk of Unplanned Readmission:  0           Discharging to Facility/ Agency   Name:   Address:  Phone:  Fax:    Dialysis Facility (if applicable)   Name:  Address:  Dialysis Schedule:  Phone:  Fax:    / signature: {Esignature:416223066}    PHYSICIAN SECTION    Prognosis: {Prognosis:7464601391}    Condition at Discharge: { Patient Condition:253292611}    Rehab Potential (if transferring to Rehab): {Prognosis:0942862440}    Recommended Labs or Other Treatments After Discharge: ***    Physician Certification: I certify the above information and transfer of Julio Arredondo  is necessary for the continuing treatment of the diagnosis listed and that he requires {Admit to Appropriate Level of Care:95019} for {GREATER/LESS:192296577} 30 days.     Update Admission H&P: {[facility] DME Changes in HandP:223163976}    PHYSICIAN SIGNATURE:  {Esignature:813724455}

## 2024-02-04 NOTE — ED PROVIDER NOTES
Emergency Department Provider Note       PCP: Not, On File (Inactive)   Age: 73 y.o.   Sex: male     DISPOSITION Decision To Discharge 02/04/2024 07:32:34 AM       ICD-10-CM    1. Diarrhea, unspecified type  R19.7           Medical Decision Making     Complexity of Problems Addressed:  Complexity of Problem: 1 acute, uncomplicated illness or injury.    Data Reviewed and Analyzed:  I independently ordered and reviewed each unique test.  I reviewed external records: ED visit note from an outside group.   The patients assessment required an independent historian: EMS.  The reason they were needed is important historical information not provided by the patient.      Discussion of management or test interpretation.  73-year-old homeless male presents via EMS from the homeless shelter, complaining of diarrhea x 1 day.  Apparently it was time for everyone to leave the shelter, and is still 36 degrees outside.  On review of old records, the patient is been here multiple times in the past for malingering, homelessness, and multiple other complaints.  As the patient's vital signs are stable, and the patient's abdomen is nontender, and has only been having symptoms for a day he will be given Imodium and a prescription for the same.  Do not feel lab work is necessary at this time.       Risk of Complications and/or Morbidity of Patient Management:  Prescription drug management performed        History      73-year-old male presents from the homeless shelter via EMS with complaint of diarrhea x 1 day.  Patient has had 4 or 5 episodes of diarrhea.  Denies abdominal pain, fever or chills, UTI or URI symptoms.  Denies melena or hematochezia.    The history is provided by the patient and the EMS personnel.        Physical Exam     Vitals signs and nursing note reviewed.   Vitals:    02/04/24 0733   BP: 102/80   Pulse: 79   Resp: 18   Temp: 98.4 °F (36.9 °C)   TempSrc: Oral   SpO2: 100%   Weight: 77.1 kg (170 lb)   Height: 1.702 m

## 2024-02-04 NOTE — DISCHARGE INSTRUCTIONS
long term care insurance forms, or Family and Medical Leave Act (FMLA) documents.  The Emergency Department does not routinely juancho extended work release statements.    More specific instructions follow.  In many cases, there are Internet links for further information.  Again, thank you for your trust in us today.  God Bless you and get well soon.

## 2024-02-04 NOTE — ED NOTES
I have reviewed discharge instructions with the patient.  The patient verbalized understanding.    Patient left ED via Discharge Method: ambulatory to Home with self.    Opportunity for questions and clarification provided.       Patient given 1 scripts.         To continue your aftercare when you leave the hospital, you may receive an automated call from our care team to check in on how you are doing.  This is a free service and part of our promise to provide the best care and service to meet your aftercare needs.” If you have questions, or wish to unsubscribe from this service please call [phone].  Thank you for Choosing our [facility] Emergency Department.        Bailey eLach LPN  02/04/24 0727

## 2024-02-04 NOTE — ED TRIAGE NOTES
Patient arrives via [facility] from homeless shelter with CO diarrhea x 1 day. Denies NV or abdominal pain

## 2024-02-26 ENCOUNTER — HOSPITAL ENCOUNTER (EMERGENCY)
Age: 74
Discharge: HOME | End: 2024-02-26
Attending: EMERGENCY MEDICINE
Payer: MEDICARE

## 2024-02-26 VITALS
BODY MASS INDEX: 26.68 KG/M2 | WEIGHT: 170 LBS | SYSTOLIC BLOOD PRESSURE: 142 MMHG | TEMPERATURE: 98.3 F | OXYGEN SATURATION: 96 % | HEART RATE: 71 BPM | HEIGHT: 67 IN | RESPIRATION RATE: 17 BRPM | DIASTOLIC BLOOD PRESSURE: 80 MMHG

## 2024-02-26 DIAGNOSIS — F19.10 POLYSUBSTANCE ABUSE (HCC): Primary | ICD-10-CM

## 2024-02-26 LAB
ALBUMIN SERPL-MCNC: 3 G/DL (ref 3.2–4.6)
ALBUMIN/GLOB SERPL: 1.1 (ref 0.4–1.6)
ALP SERPL-CCNC: 91 U/L (ref 50–136)
ALT SERPL-CCNC: 22 U/L (ref 12–65)
AMPHET UR QL SCN: POSITIVE
ANION GAP SERPL CALC-SCNC: 2 MMOL/L (ref 2–11)
APPEARANCE UR: CLEAR
AST SERPL-CCNC: 22 U/L (ref 15–37)
BARBITURATES UR QL SCN: NEGATIVE
BASOPHILS # BLD: 0 K/UL (ref 0–0.2)
BASOPHILS NFR BLD: 0 % (ref 0–2)
BENZODIAZ UR QL: NEGATIVE
BILIRUB SERPL-MCNC: 0.4 MG/DL (ref 0.2–1.1)
BILIRUB UR QL: NEGATIVE
BUN SERPL-MCNC: 26 MG/DL (ref 8–23)
CALCIUM SERPL-MCNC: 8.6 MG/DL (ref 8.3–10.4)
CANNABINOIDS UR QL SCN: POSITIVE
CHLORIDE SERPL-SCNC: 111 MMOL/L (ref 103–113)
CO2 SERPL-SCNC: 29 MMOL/L (ref 21–32)
COCAINE UR QL SCN: POSITIVE
COLOR UR: NORMAL
CREAT SERPL-MCNC: 1.1 MG/DL (ref 0.8–1.5)
DIFFERENTIAL METHOD BLD: ABNORMAL
EOSINOPHIL # BLD: 0.2 K/UL (ref 0–0.8)
EOSINOPHIL NFR BLD: 3 % (ref 0.5–7.8)
ERYTHROCYTE [DISTWIDTH] IN BLOOD BY AUTOMATED COUNT: 13.1 % (ref 11.9–14.6)
ETHANOL SERPL-MCNC: <3 MG/DL (ref 0–0.08)
GLOBULIN SER CALC-MCNC: 2.8 G/DL (ref 2.8–4.5)
GLUCOSE SERPL-MCNC: 128 MG/DL (ref 65–100)
GLUCOSE UR STRIP.AUTO-MCNC: NEGATIVE MG/DL
HCT VFR BLD AUTO: 36.8 % (ref 41.1–50.3)
HGB BLD-MCNC: 12 G/DL (ref 13.6–17.2)
HGB UR QL STRIP: NEGATIVE
IMM GRANULOCYTES # BLD AUTO: 0.1 K/UL (ref 0–0.5)
IMM GRANULOCYTES NFR BLD AUTO: 1 % (ref 0–5)
KETONES UR QL STRIP.AUTO: NEGATIVE MG/DL
LEUKOCYTE ESTERASE UR QL STRIP.AUTO: NEGATIVE
LIPASE SERPL-CCNC: 159 U/L (ref 73–393)
LYMPHOCYTES # BLD: 1.5 K/UL (ref 0.5–4.6)
LYMPHOCYTES NFR BLD: 20 % (ref 13–44)
MAGNESIUM SERPL-MCNC: 2.4 MG/DL (ref 1.8–2.4)
MCH RBC QN AUTO: 29.3 PG (ref 26.1–32.9)
MCHC RBC AUTO-ENTMCNC: 32.6 G/DL (ref 31.4–35)
MCV RBC AUTO: 89.8 FL (ref 82–102)
METHADONE UR QL: NEGATIVE
MONOCYTES # BLD: 0.7 K/UL (ref 0.1–1.3)
MONOCYTES NFR BLD: 9 % (ref 4–12)
NEUTS SEG # BLD: 5.1 K/UL (ref 1.7–8.2)
NEUTS SEG NFR BLD: 67 % (ref 43–78)
NITRITE UR QL STRIP.AUTO: NEGATIVE
NRBC # BLD: 0 K/UL (ref 0–0.2)
OPIATES UR QL: NEGATIVE
PCP UR QL: NEGATIVE
PH UR STRIP: 6 (ref 5–9)
PHOSPHATE SERPL-MCNC: 3.3 MG/DL (ref 2.3–3.7)
PLATELET # BLD AUTO: 207 K/UL (ref 150–450)
PMV BLD AUTO: 10.2 FL (ref 9.4–12.3)
POTASSIUM SERPL-SCNC: 4.2 MMOL/L (ref 3.5–5.1)
PROT SERPL-MCNC: 5.8 G/DL (ref 6.3–8.2)
PROT UR STRIP-MCNC: NEGATIVE MG/DL
RBC # BLD AUTO: 4.1 M/UL (ref 4.23–5.6)
SODIUM SERPL-SCNC: 142 MMOL/L (ref 136–146)
SP GR UR REFRACTOMETRY: 1.02 (ref 1–1.02)
UROBILINOGEN UR QL STRIP.AUTO: 0.2 EU/DL (ref 0.2–1)
WBC # BLD AUTO: 7.5 K/UL (ref 4.3–11.1)

## 2024-02-26 PROCEDURE — 6370000000 HC RX 637 (ALT 250 FOR IP): Performed by: EMERGENCY MEDICINE

## 2024-02-26 PROCEDURE — 2580000003 HC RX 258: Performed by: EMERGENCY MEDICINE

## 2024-02-26 PROCEDURE — 96374 THER/PROPH/DIAG INJ IV PUSH: CPT

## 2024-02-26 PROCEDURE — 82077 ASSAY SPEC XCP UR&BREATH IA: CPT

## 2024-02-26 PROCEDURE — 93005 ELECTROCARDIOGRAM TRACING: CPT | Performed by: EMERGENCY MEDICINE

## 2024-02-26 PROCEDURE — 81003 URINALYSIS AUTO W/O SCOPE: CPT

## 2024-02-26 PROCEDURE — 96361 HYDRATE IV INFUSION ADD-ON: CPT

## 2024-02-26 PROCEDURE — 80307 DRUG TEST PRSMV CHEM ANLYZR: CPT

## 2024-02-26 PROCEDURE — 84100 ASSAY OF PHOSPHORUS: CPT

## 2024-02-26 PROCEDURE — 80053 COMPREHEN METABOLIC PANEL: CPT

## 2024-02-26 PROCEDURE — 99284 EMERGENCY DEPT VISIT MOD MDM: CPT

## 2024-02-26 PROCEDURE — 6360000002 HC RX W HCPCS: Performed by: EMERGENCY MEDICINE

## 2024-02-26 PROCEDURE — 83735 ASSAY OF MAGNESIUM: CPT

## 2024-02-26 PROCEDURE — 83690 ASSAY OF LIPASE: CPT

## 2024-02-26 PROCEDURE — 85025 COMPLETE CBC W/AUTO DIFF WBC: CPT

## 2024-02-26 RX ORDER — THIAMINE HYDROCHLORIDE 100 MG/ML
100 INJECTION, SOLUTION INTRAMUSCULAR; INTRAVENOUS ONCE
Status: COMPLETED | OUTPATIENT
Start: 2024-02-26 | End: 2024-02-26

## 2024-02-26 RX ORDER — FOLIC ACID 1 MG/1
1 TABLET ORAL ONCE
Status: COMPLETED | OUTPATIENT
Start: 2024-02-26 | End: 2024-02-26

## 2024-02-26 RX ORDER — 0.9 % SODIUM CHLORIDE 0.9 %
1000 INTRAVENOUS SOLUTION INTRAVENOUS
Status: COMPLETED | OUTPATIENT
Start: 2024-02-26 | End: 2024-02-26

## 2024-02-26 RX ADMIN — FOLIC ACID 1 MG: 1 TABLET ORAL at 14:40

## 2024-02-26 RX ADMIN — SODIUM CHLORIDE 1000 ML: 9 INJECTION, SOLUTION INTRAVENOUS at 14:40

## 2024-02-26 RX ADMIN — THIAMINE HYDROCHLORIDE 100 MG: 100 INJECTION, SOLUTION INTRAMUSCULAR; INTRAVENOUS at 14:41

## 2024-02-26 ASSESSMENT — PAIN SCALES - GENERAL: PAINLEVEL_OUTOF10: 7

## 2024-02-26 ASSESSMENT — LIFESTYLE VARIABLES
HOW OFTEN DO YOU HAVE A DRINK CONTAINING ALCOHOL: 2-3 TIMES A WEEK
HOW MANY STANDARD DRINKS CONTAINING ALCOHOL DO YOU HAVE ON A TYPICAL DAY: 3 OR 4

## 2024-02-26 ASSESSMENT — ENCOUNTER SYMPTOMS
SHORTNESS OF BREATH: 0
ABDOMINAL PAIN: 0

## 2024-02-26 ASSESSMENT — PAIN - FUNCTIONAL ASSESSMENT: PAIN_FUNCTIONAL_ASSESSMENT: 0-10

## 2024-02-26 ASSESSMENT — PAIN DESCRIPTION - LOCATION: LOCATION: ELBOW
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
## 2024-02-27 LAB
EKG ATRIAL RATE: 82 BPM
EKG DIAGNOSIS: NORMAL
EKG P AXIS: 55 DEGREES
EKG P-R INTERVAL: 118 MS
EKG Q-T INTERVAL: 399 MS
EKG QRS DURATION: 91 MS
EKG QTC CALCULATION (BAZETT): 464 MS
EKG R AXIS: 63 DEGREES
EKG T AXIS: 82 DEGREES
EKG VENTRICULAR RATE: 81 BPM

## 2024-02-27 NOTE — ED NOTES
Patient alert, oriented, and walking without assistance. MD notified. Provided patient with sandwich tray at this time.     Harper Herny RN  02/26/24 9188

## 2024-02-27 NOTE — ED NOTES
Report given to SONDRA Saleem to assume care at this time.       Hector Soriano, RN  02/26/24 1080

## 2024-02-27 NOTE — DISCHARGE INSTRUCTIONS
Stop using illegal drugs  Drink plenty of fluids  Avoid alcohol  Follow-up with free clinic    Return to ER for any worsening symptoms or new problems which may arise

## 2024-02-27 NOTE — ED NOTES
Called [facility] to transport patient to [address] [place] [address]. ETA 1230 AM to 3:00 AM. Trip number [phone].     Easton Dinero  02/27/24 0012

## 2024-02-27 NOTE — ED NOTES
Per Chinmay WILKINS okay to discharge patient at this time.      Harper eHnry, RN  02/26/24 7515

## 2024-08-29 ENCOUNTER — HOSPITAL ENCOUNTER (EMERGENCY)
Age: 74
Discharge: HOME | End: 2024-08-29
Attending: EMERGENCY MEDICINE

## 2024-08-29 VITALS
SYSTOLIC BLOOD PRESSURE: 124 MMHG | HEART RATE: 71 BPM | TEMPERATURE: 98 F | OXYGEN SATURATION: 99 % | DIASTOLIC BLOOD PRESSURE: 80 MMHG | RESPIRATION RATE: 16 BRPM

## 2024-08-29 NOTE — ED TRIAGE NOTES
Pt arrives via [facility] coming from [facility] c/o heat exhaustion after walking all day

## 2025-02-03 ENCOUNTER — APPOINTMENT (OUTPATIENT)
Dept: GENERAL RADIOLOGY | Age: 75
End: 2025-02-03
Payer: MEDICARE

## 2025-02-03 ENCOUNTER — HOSPITAL ENCOUNTER (EMERGENCY)
Age: 75
Discharge: HOME | End: 2025-02-03
Attending: EMERGENCY MEDICINE
Payer: MEDICARE

## 2025-02-03 VITALS
HEART RATE: 59 BPM | BODY MASS INDEX: 27.32 KG/M2 | WEIGHT: 170 LBS | OXYGEN SATURATION: 100 % | HEIGHT: 66 IN | DIASTOLIC BLOOD PRESSURE: 85 MMHG | TEMPERATURE: 97.9 F | SYSTOLIC BLOOD PRESSURE: 151 MMHG | RESPIRATION RATE: 16 BRPM

## 2025-02-03 DIAGNOSIS — R05.1 ACUTE COUGH: ICD-10-CM

## 2025-02-03 DIAGNOSIS — B34.9 VIRAL ILLNESS: Primary | ICD-10-CM

## 2025-02-03 LAB
FLUAV RNA SPEC QL NAA+PROBE: NOT DETECTED
FLUBV RNA SPEC QL NAA+PROBE: NOT DETECTED
SARS-COV-2 RDRP RESP QL NAA+PROBE: NOT DETECTED
SOURCE: NORMAL

## 2025-02-03 PROCEDURE — 99284 EMERGENCY DEPT VISIT MOD MDM: CPT

## 2025-02-03 PROCEDURE — 87502 INFLUENZA DNA AMP PROBE: CPT

## 2025-02-03 PROCEDURE — 71046 X-RAY EXAM CHEST 2 VIEWS: CPT

## 2025-02-03 PROCEDURE — 87635 SARS-COV-2 COVID-19 AMP PRB: CPT

## 2025-02-03 RX ORDER — BENZONATATE 100 MG/1
100 CAPSULE ORAL 3 TIMES DAILY PRN
Qty: 30 CAPSULE | Refills: 0 | Status: SHIPPED | OUTPATIENT
Start: 2025-02-03 | End: 2025-02-10

## 2025-02-03 ASSESSMENT — ENCOUNTER SYMPTOMS
SHORTNESS OF BREATH: 1
COLOR CHANGE: 0
SORE THROAT: 1
BACK PAIN: 0
CONSTIPATION: 0
COUGH: 1
DIARRHEA: 0
NAUSEA: 0
VOMITING: 0
RHINORRHEA: 0
ABDOMINAL PAIN: 0

## 2025-02-03 ASSESSMENT — PAIN SCALES - GENERAL: PAINLEVEL_OUTOF10: 5

## 2025-02-03 ASSESSMENT — LIFESTYLE VARIABLES
HOW MANY STANDARD DRINKS CONTAINING ALCOHOL DO YOU HAVE ON A TYPICAL DAY: PATIENT DECLINED
HOW OFTEN DO YOU HAVE A DRINK CONTAINING ALCOHOL: PATIENT DECLINED

## 2025-02-03 ASSESSMENT — PAIN DESCRIPTION - LOCATION: LOCATION: CHEST

## 2025-02-03 NOTE — ED PROVIDER NOTES
Emergency Department Provider Note       PCP: Not, On File (Inactive)   Age: 74 y.o.   Sex: male     DISPOSITION Decision To Discharge 02/03/2025 09:38:24 AM    ICD-10-CM    1. Viral illness  B34.9       2. Acute cough  R05.1           Medical Decision Making     Patient with cough shortness of breath and weakness for the past couple days.  COVID and flu negative.  No acute on chest x-ray.  Will discharge as viral illness.     1 or more acute illnesses that pose a threat to life or bodily function.   Prescription drug management performed.  Patient was discharged risks and benefits of hospitalization were considered.  Shared medical decision making was utilized in creating the patients health plan today.  I independently ordered and reviewed each unique test.       The patients assessment required an independent historian: ems.  The reason they were needed is important historical information not provided by the patient.    I interpreted the X-rays no infiltrate.              History     Patient with cough congestion and shortness of breath for the past couple days.  Getting worse so came here.    The history is provided by the patient and the EMS personnel. No  was used.   Cough  Cough characteristics:  Productive  Sputum characteristics:  Gray  Severity:  Moderate  Duration:  2 days  Timing:  Constant  Progression:  Worsening  Chronicity:  New  Relieved by:  Nothing  Worsened by:  Nothing  Associated symptoms: shortness of breath and sore throat    Associated symptoms: no chest pain, no chills, no fever, no headaches, no rash and no rhinorrhea    Shortness of Breath  Associated symptoms: cough and sore throat    Associated symptoms: no abdominal pain, no chest pain, no fever, no headaches, no neck pain, no rash and no vomiting        ROS     Review of Systems   Constitutional:  Negative for chills and fever.   HENT:  Positive for congestion and sore throat. Negative for rhinorrhea.

## 2025-02-03 NOTE — ED TRIAGE NOTES
Pt arrives via [facility] from [facility]. Pt complains of cough, generalized weakness and SOB x2 days.

## 2025-02-03 NOTE — ED NOTES
Patient mobility status  with mild difficulty.     I have reviewed discharge instructions with the patient.  The patient verbalized understanding.    Patient left ED via Discharge Method: ambulatory to [facility] with  self .    Opportunity for questions and clarification provided.     Patient given 1 scripts.           Umang Jurado RN  02/03/25 1047

## 2025-07-03 VITALS
HEART RATE: 83 BPM | RESPIRATION RATE: 20 BRPM | DIASTOLIC BLOOD PRESSURE: 83 MMHG | TEMPERATURE: 98.1 F | OXYGEN SATURATION: 95 % | SYSTOLIC BLOOD PRESSURE: 120 MMHG

## 2025-07-03 PROCEDURE — 99283 EMERGENCY DEPT VISIT LOW MDM: CPT

## 2025-07-03 PROCEDURE — 12001 RPR S/N/AX/GEN/TRNK 2.5CM/<: CPT

## 2025-07-04 ENCOUNTER — HOSPITAL ENCOUNTER (EMERGENCY)
Age: 75
Discharge: LEFT BEFORE DISCHARGE | End: 2025-07-04
Payer: OTHER GOVERNMENT

## 2025-07-04 DIAGNOSIS — S61.210A LACERATION OF RIGHT INDEX FINGER WITHOUT FOREIGN BODY WITHOUT DAMAGE TO NAIL, INITIAL ENCOUNTER: Primary | ICD-10-CM

## 2025-07-04 PROCEDURE — 96372 THER/PROPH/DIAG INJ SC/IM: CPT

## 2025-07-04 PROCEDURE — 90471 IMMUNIZATION ADMIN: CPT | Performed by: PHYSICIAN ASSISTANT

## 2025-07-04 PROCEDURE — 2500000003 HC RX 250 WO HCPCS: Performed by: PHYSICIAN ASSISTANT

## 2025-07-04 PROCEDURE — 6360000002 HC RX W HCPCS: Performed by: PHYSICIAN ASSISTANT

## 2025-07-04 PROCEDURE — 90714 TD VACC NO PRESV 7 YRS+ IM: CPT | Performed by: PHYSICIAN ASSISTANT

## 2025-07-04 PROCEDURE — 6370000000 HC RX 637 (ALT 250 FOR IP): Performed by: PHYSICIAN ASSISTANT

## 2025-07-04 RX ORDER — LORAZEPAM 1 MG/1
0.5 TABLET ORAL EVERY 4 HOURS PRN
Status: DISCONTINUED | OUTPATIENT
Start: 2025-07-04 | End: 2025-07-04 | Stop reason: HOSPADM

## 2025-07-04 RX ADMIN — WATER 1000 MG: 1 INJECTION INTRAMUSCULAR; INTRAVENOUS; SUBCUTANEOUS at 03:16

## 2025-07-04 RX ADMIN — CLOSTRIDIUM TETANI TOXOID ANTIGEN (FORMALDEHYDE INACTIVATED) AND CORYNEBACTERIUM DIPHTHERIAE TOXOID ANTIGEN (FORMALDEHYDE INACTIVATED) 0.5 ML: 5; 2 INJECTION, SUSPENSION INTRAMUSCULAR at 03:14

## 2025-07-04 RX ADMIN — LORAZEPAM 0.5 MG: 1 TABLET ORAL at 00:45

## 2025-07-04 NOTE — ED NOTES
Patient was discharged per third shift, uber was set by . Charge was communicated when starting a shift. Patient was walking and drinking fluids.   Per security patient sat in uber and went home.      Tatianna Potter RN  07/04/25 9987

## 2025-07-04 NOTE — ED NOTES
Patient was out in the lobby prior to starting a shift - third shift triage RN informed that patient is discharged. Attempted to ambulate patient and assess ambulation. Patient is unsteady and unable to ambulate without a fall risk.      Tatianna Potter RN  07/04/25 0757
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
